# Patient Record
Sex: MALE | Race: BLACK OR AFRICAN AMERICAN | NOT HISPANIC OR LATINO | Employment: FULL TIME | URBAN - METROPOLITAN AREA
[De-identification: names, ages, dates, MRNs, and addresses within clinical notes are randomized per-mention and may not be internally consistent; named-entity substitution may affect disease eponyms.]

---

## 2017-12-31 ENCOUNTER — HISTORICAL (OUTPATIENT)
Dept: ADMINISTRATIVE | Facility: HOSPITAL | Age: 57
End: 2017-12-31

## 2018-01-03 LAB — FINAL CULTURE: NORMAL

## 2021-04-14 ENCOUNTER — HISTORICAL (OUTPATIENT)
Dept: ADMINISTRATIVE | Facility: HOSPITAL | Age: 61
End: 2021-04-14

## 2021-04-14 LAB
ABS NEUT (OLG): 4.1 X10(3)/MCL (ref 2.1–9.2)
ALBUMIN SERPL-MCNC: 3.4 GM/DL (ref 3.5–5)
ALBUMIN/GLOB SERPL: 1 RATIO (ref 1.1–2)
ALP SERPL-CCNC: 121 UNIT/L (ref 40–150)
ALT SERPL-CCNC: 13 UNIT/L (ref 0–55)
APPEARANCE, UA: CLEAR
AST SERPL-CCNC: 16 UNIT/L (ref 5–34)
BACTERIA SPEC CULT: ABNORMAL /HPF
BASOPHILS # BLD AUTO: 0.1 X10(3)/MCL (ref 0–0.2)
BASOPHILS NFR BLD AUTO: 1 %
BILIRUB SERPL-MCNC: 1.1 MG/DL
BILIRUB UR QL STRIP: ABNORMAL
BILIRUBIN DIRECT+TOT PNL SERPL-MCNC: 0.4 MG/DL (ref 0–0.5)
BILIRUBIN DIRECT+TOT PNL SERPL-MCNC: 0.7 MG/DL (ref 0–0.8)
BUN SERPL-MCNC: 18.2 MG/DL (ref 8.4–25.7)
CALCIUM SERPL-MCNC: 9.2 MG/DL (ref 8.4–10.2)
CHLORIDE SERPL-SCNC: 107 MMOL/L (ref 98–107)
CHOLEST SERPL-MCNC: 171 MG/DL
CHOLEST/HDLC SERPL: 4 {RATIO} (ref 0–5)
CK SERPL-CCNC: 130 U/L (ref 30–200)
CO2 SERPL-SCNC: 27 MMOL/L (ref 22–29)
COLOR UR: ABNORMAL
CREAT SERPL-MCNC: 1.59 MG/DL (ref 0.73–1.18)
CREAT UR-MCNC: 309.7 MG/DL (ref 58–161)
EOSINOPHIL # BLD AUTO: 0.2 X10(3)/MCL (ref 0–0.9)
EOSINOPHIL NFR BLD AUTO: 3 %
ERYTHROCYTE [DISTWIDTH] IN BLOOD BY AUTOMATED COUNT: 12.2 % (ref 11.5–17)
EST. AVERAGE GLUCOSE BLD GHB EST-MCNC: 182.9 MG/DL
GLOBULIN SER-MCNC: 3.3 GM/DL (ref 2.4–3.5)
GLUCOSE (UA): NEGATIVE
GLUCOSE SERPL-MCNC: 68 MG/DL (ref 74–100)
HBA1C MFR BLD: 8 %
HCT VFR BLD AUTO: 46.7 % (ref 42–52)
HDLC SERPL-MCNC: 38 MG/DL (ref 35–60)
HGB BLD-MCNC: 14.6 GM/DL (ref 14–18)
HGB UR QL STRIP: NEGATIVE
KETONES UR QL STRIP: ABNORMAL
LDLC SERPL CALC-MCNC: 107 MG/DL (ref 50–140)
LEUKOCYTE ESTERASE UR QL STRIP: NEGATIVE
LYMPHOCYTES # BLD AUTO: 1.3 X10(3)/MCL (ref 0.6–4.6)
LYMPHOCYTES NFR BLD AUTO: 21 %
MCH RBC QN AUTO: 29.8 PG (ref 27–31)
MCHC RBC AUTO-ENTMCNC: 31.3 GM/DL (ref 33–36)
MCV RBC AUTO: 95.3 FL (ref 80–94)
MICROALBUMIN UR-MCNC: 7643.5 UG/ML
MICROALBUMIN/CREAT RATIO PNL UR: 2468 MG/GM CR (ref 0–30)
MONOCYTES # BLD AUTO: 0.5 X10(3)/MCL (ref 0.1–1.3)
MONOCYTES NFR BLD AUTO: 8 %
NEUTROPHILS # BLD AUTO: 4.1 X10(3)/MCL (ref 2.1–9.2)
NEUTROPHILS NFR BLD AUTO: 67 %
NITRITE UR QL STRIP: NEGATIVE
PH UR STRIP: 5 [PH] (ref 5–9)
PLATELET # BLD AUTO: 414 X10(3)/MCL (ref 130–400)
PMV BLD AUTO: 11.5 FL (ref 9.4–12.4)
POTASSIUM SERPL-SCNC: 4.6 MMOL/L (ref 3.5–5.1)
PROT SERPL-MCNC: 6.7 GM/DL (ref 6.4–8.3)
PROT UR QL STRIP: ABNORMAL
PSA SERPL-MCNC: 1.74 NG/ML
RBC # BLD AUTO: 4.9 X10(6)/MCL (ref 4.7–6.1)
RBC #/AREA URNS HPF: ABNORMAL /[HPF]
SODIUM SERPL-SCNC: 143 MMOL/L (ref 136–145)
SP GR UR STRIP: 1.03 (ref 1–1.03)
SQUAMOUS EPITHELIAL, UA: ABNORMAL /HPF (ref 0–4)
TRIGL SERPL-MCNC: 130 MG/DL (ref 34–140)
TSH SERPL-ACNC: 1.33 UIU/ML (ref 0.35–4.94)
UROBILINOGEN UR STRIP-ACNC: 1
VLDLC SERPL CALC-MCNC: 26 MG/DL
WBC # SPEC AUTO: 6.1 X10(3)/MCL (ref 4.5–11.5)
WBC #/AREA URNS HPF: ABNORMAL /HPF

## 2021-05-07 ENCOUNTER — HISTORICAL (OUTPATIENT)
Dept: RADIOLOGY | Facility: HOSPITAL | Age: 61
End: 2021-05-07

## 2022-03-10 LAB
LEFT EYE DM RETINOPATHY: POSITIVE
LEFT EYE DM RETINOPATHY: POSITIVE
RIGHT EYE DM RETINOPATHY: POSITIVE
RIGHT EYE DM RETINOPATHY: POSITIVE

## 2022-04-11 ENCOUNTER — HISTORICAL (OUTPATIENT)
Dept: ADMINISTRATIVE | Facility: HOSPITAL | Age: 62
End: 2022-04-11

## 2022-04-27 VITALS
DIASTOLIC BLOOD PRESSURE: 85 MMHG | SYSTOLIC BLOOD PRESSURE: 132 MMHG | HEIGHT: 74 IN | WEIGHT: 215.19 LBS | BODY MASS INDEX: 27.62 KG/M2 | OXYGEN SATURATION: 98 %

## 2022-05-13 ENCOUNTER — TELEPHONE (OUTPATIENT)
Dept: FAMILY MEDICINE | Facility: CLINIC | Age: 62
End: 2022-05-13

## 2022-05-13 NOTE — TELEPHONE ENCOUNTER
----- Message from Jodie Hassan sent at 5/13/2022  9:49 AM CDT -----  Regarding: Return call  Type:  Patient Returning Call    Who Called:patient  Who Left Message for Patient:  Does the patient know what this is regarding?:  Would the patient rather a call back or a response via [facility]?   Best Call Back Number: [phone]  Additional Information:

## 2022-05-16 ENCOUNTER — OFFICE VISIT (OUTPATIENT)
Dept: FAMILY MEDICINE | Facility: CLINIC | Age: 62
End: 2022-05-16
Payer: COMMERCIAL

## 2022-05-16 VITALS
OXYGEN SATURATION: 97 % | DIASTOLIC BLOOD PRESSURE: 88 MMHG | WEIGHT: 224.38 LBS | BODY MASS INDEX: 28.8 KG/M2 | HEART RATE: 86 BPM | TEMPERATURE: 98 F | RESPIRATION RATE: 16 BRPM | SYSTOLIC BLOOD PRESSURE: 154 MMHG | HEIGHT: 74 IN

## 2022-05-16 DIAGNOSIS — R06.02 SOB (SHORTNESS OF BREATH): ICD-10-CM

## 2022-05-16 DIAGNOSIS — R60.0 EDEMA, LOWER EXTREMITY: ICD-10-CM

## 2022-05-16 DIAGNOSIS — I10 PRIMARY HYPERTENSION: Primary | ICD-10-CM

## 2022-05-16 DIAGNOSIS — R05.9 COUGH: ICD-10-CM

## 2022-05-16 PROBLEM — Z79.4 TYPE 2 DIABETES MELLITUS TREATED WITH INSULIN: Status: ACTIVE | Noted: 2018-07-20

## 2022-05-16 PROBLEM — I50.9 CONGESTIVE HEART FAILURE: Status: ACTIVE | Noted: 2022-05-16

## 2022-05-16 PROBLEM — N18.9 CHRONIC KIDNEY DISEASE: Status: ACTIVE | Noted: 2022-05-16

## 2022-05-16 PROBLEM — Z87.01 HISTORY OF PNEUMONIA: Status: ACTIVE | Noted: 2022-05-16

## 2022-05-16 PROBLEM — R80.9 PROTEINURIA: Chronic | Status: ACTIVE | Noted: 2021-05-31

## 2022-05-16 PROBLEM — R80.9 PROTEINURIA: Status: ACTIVE | Noted: 2021-05-31

## 2022-05-16 PROBLEM — E11.9 TYPE 2 DIABETES MELLITUS TREATED WITH INSULIN: Status: ACTIVE | Noted: 2018-07-20

## 2022-05-16 PROBLEM — Z79.4 TYPE 2 DIABETES MELLITUS TREATED WITH INSULIN: Chronic | Status: ACTIVE | Noted: 2018-07-20

## 2022-05-16 PROBLEM — N18.9 CHRONIC KIDNEY DISEASE: Chronic | Status: ACTIVE | Noted: 2022-05-16

## 2022-05-16 PROBLEM — I50.9 CONGESTIVE HEART FAILURE: Chronic | Status: ACTIVE | Noted: 2022-05-16

## 2022-05-16 PROBLEM — E11.9 TYPE 2 DIABETES MELLITUS TREATED WITH INSULIN: Chronic | Status: ACTIVE | Noted: 2018-07-20

## 2022-05-16 PROCEDURE — 3079F DIAST BP 80-89 MM HG: CPT | Mod: CPTII,,, | Performed by: FAMILY MEDICINE

## 2022-05-16 PROCEDURE — 1159F MED LIST DOCD IN RCRD: CPT | Mod: CPTII,,, | Performed by: FAMILY MEDICINE

## 2022-05-16 PROCEDURE — 3077F SYST BP >= 140 MM HG: CPT | Mod: CPTII,,, | Performed by: FAMILY MEDICINE

## 2022-05-16 PROCEDURE — 1159F PR MEDICATION LIST DOCUMENTED IN MEDICAL RECORD: ICD-10-PCS | Mod: CPTII,,, | Performed by: FAMILY MEDICINE

## 2022-05-16 PROCEDURE — 1160F RVW MEDS BY RX/DR IN RCRD: CPT | Mod: CPTII,,, | Performed by: FAMILY MEDICINE

## 2022-05-16 PROCEDURE — 3079F PR MOST RECENT DIASTOLIC BLOOD PRESSURE 80-89 MM HG: ICD-10-PCS | Mod: CPTII,,, | Performed by: FAMILY MEDICINE

## 2022-05-16 PROCEDURE — 3077F PR MOST RECENT SYSTOLIC BLOOD PRESSURE >= 140 MM HG: ICD-10-PCS | Mod: CPTII,,, | Performed by: FAMILY MEDICINE

## 2022-05-16 PROCEDURE — 4010F PR ACE/ARB THEARPY RXD/TAKEN: ICD-10-PCS | Mod: CPTII,,, | Performed by: FAMILY MEDICINE

## 2022-05-16 PROCEDURE — 3008F BODY MASS INDEX DOCD: CPT | Mod: CPTII,,, | Performed by: FAMILY MEDICINE

## 2022-05-16 PROCEDURE — 99212 PR OFFICE/OUTPT VISIT, EST, LEVL II, 10-19 MIN: ICD-10-PCS | Mod: ,,, | Performed by: FAMILY MEDICINE

## 2022-05-16 PROCEDURE — 99212 OFFICE O/P EST SF 10 MIN: CPT | Mod: ,,, | Performed by: FAMILY MEDICINE

## 2022-05-16 PROCEDURE — 3008F PR BODY MASS INDEX (BMI) DOCUMENTED: ICD-10-PCS | Mod: CPTII,,, | Performed by: FAMILY MEDICINE

## 2022-05-16 PROCEDURE — 1160F PR REVIEW ALL MEDS BY PRESCRIBER/CLIN PHARMACIST DOCUMENTED: ICD-10-PCS | Mod: CPTII,,, | Performed by: FAMILY MEDICINE

## 2022-05-16 PROCEDURE — 4010F ACE/ARB THERAPY RXD/TAKEN: CPT | Mod: CPTII,,, | Performed by: FAMILY MEDICINE

## 2022-05-16 RX ORDER — GLYBURIDE-METFORMIN HYDROCHLORIDE 5; 500 MG/1; MG/1
2 TABLET ORAL 2 TIMES DAILY WITH MEALS
COMMUNITY
Start: 2022-05-06

## 2022-05-16 RX ORDER — SYRING-NEEDL,DISP,INSUL,0.3 ML 29 G X1/2"
1 SYRINGE, EMPTY DISPOSABLE MISCELLANEOUS NIGHTLY
COMMUNITY
Start: 2022-02-21

## 2022-05-16 RX ORDER — DAPAGLIFLOZIN 10 MG/1
10 TABLET, FILM COATED ORAL DAILY
COMMUNITY
Start: 2021-11-29

## 2022-05-16 RX ORDER — INSULIN GLARGINE 100 [IU]/ML
16 INJECTION, SOLUTION SUBCUTANEOUS NIGHTLY
COMMUNITY
Start: 2022-02-22

## 2022-05-16 RX ORDER — ATORVASTATIN CALCIUM 40 MG/1
40 TABLET, FILM COATED ORAL NIGHTLY
COMMUNITY
Start: 2022-05-06

## 2022-05-16 RX ORDER — POTASSIUM CHLORIDE 20 MEQ/1
20 TABLET, EXTENDED RELEASE ORAL DAILY PRN
COMMUNITY
Start: 2022-05-06

## 2022-05-16 RX ORDER — PEN NEEDLE, DIABETIC 32 GX 1/6"
1 NEEDLE, DISPOSABLE MISCELLANEOUS DAILY
COMMUNITY
Start: 2022-04-19

## 2022-05-16 RX ORDER — SACUBITRIL AND VALSARTAN 97; 103 MG/1; MG/1
1 TABLET, FILM COATED ORAL 2 TIMES DAILY
COMMUNITY
Start: 2022-04-24

## 2022-05-16 RX ORDER — ALBUTEROL SULFATE 90 UG/1
1 AEROSOL, METERED RESPIRATORY (INHALATION) EVERY 4 HOURS PRN
COMMUNITY
Start: 2022-04-10

## 2022-05-16 RX ORDER — FUROSEMIDE 40 MG/1
1 TABLET ORAL DAILY PRN
COMMUNITY
Start: 2022-05-09

## 2022-05-16 RX ORDER — CARVEDILOL 12.5 MG/1
12.5 TABLET ORAL 2 TIMES DAILY
COMMUNITY
Start: 2021-11-29 | End: 2022-05-16

## 2022-05-16 RX ORDER — UMECLIDINIUM BROMIDE AND VILANTEROL TRIFENATATE 62.5; 25 UG/1; UG/1
1 POWDER RESPIRATORY (INHALATION) DAILY PRN
COMMUNITY
Start: 2022-04-12

## 2022-05-16 RX ORDER — CLONIDINE HYDROCHLORIDE 0.1 MG/1
0.1 TABLET ORAL 3 TIMES DAILY
COMMUNITY
Start: 2021-12-15 | End: 2022-05-16

## 2022-05-16 RX ORDER — CARVEDILOL 6.25 MG/1
6.25 TABLET ORAL 2 TIMES DAILY WITH MEALS
COMMUNITY

## 2022-05-16 RX ORDER — ASPIRIN 81 MG/1
81 TABLET ORAL DAILY
COMMUNITY
Start: 2021-07-21

## 2022-05-16 RX ORDER — FLASH GLUCOSE SENSOR
1 KIT MISCELLANEOUS DAILY
COMMUNITY
Start: 2022-05-06

## 2022-05-16 NOTE — PROGRESS NOTES
Subjective:      Patient ID: Dominick Woody is a 61 y.o. male.    Chief Complaint: discuss medications    Disclaimer:  This note is prepared using voice recognition software and as such is likely to have errors and has not been proof read. Please contact me for questions.     61-year-old male with history of hypertension, diabetes mellitus type 2, hyperlipidemia and CHF who presents for discussion of medication management and shortness of breath.  The patient states that he was initially taking Coreg at 12.5 mg b.i.d. however his medication was decreased to 6.25 mg b.i.d. by his new cardiologist.  The patient states that he has noticed an increase in his blood pressure as well as increased lower extremity swelling since that time.  The patient would like to increase Coreg back to 12.5 mg b.i.d..  The patient states he has also had increased shortness of breath over the last few weeks in addition to the worsening lower extremity edema.  He states that he took a 40 mg tablet of Lasix this morning.  He denies any chest pain, dizziness, blurred vision, palpitations, or headaches.  The patient admits to mild intermittent cough but denies any sinus congestion, ear pain, sore throat, fever, or exposure to sick contacts.        Review of Systems   Constitutional: Negative for chills, diaphoresis, fever and malaise/fatigue.   Eyes: Negative for blurred vision and double vision.   Respiratory: Positive for cough and shortness of breath. Negative for wheezing.    Cardiovascular: Positive for leg swelling. Negative for chest pain and palpitations.   Gastrointestinal: Negative for abdominal pain, diarrhea, nausea and vomiting.   Neurological: Negative for dizziness and headaches.     Objective:     Vitals:    05/16/22 1314   BP: (!) 160/84   BP Location: Left arm   Patient Position: Sitting   BP Method: Large (Manual)   Pulse: 86   Resp: 16   Temp: 98 °F (36.7 °C)   TempSrc: Oral   SpO2: 97%   Weight: 101.8 kg (224 lb 6.4 oz)  "  Height: 6' 2" (1.88 m)     Physical Exam  Vitals and nursing note reviewed.   Constitutional:       General: He is not in acute distress.     Appearance: Normal appearance. He is well-developed and well-groomed.   HENT:      Head: Normocephalic and atraumatic.      Right Ear: External ear normal.      Left Ear: External ear normal.      Nose: Nose normal.      Mouth/Throat:      Mouth: Mucous membranes are moist.      Pharynx: Oropharynx is clear.   Eyes:      General: No scleral icterus.     Extraocular Movements: Extraocular movements intact.      Conjunctiva/sclera: Conjunctivae normal.   Cardiovascular:      Rate and Rhythm: Normal rate and regular rhythm.      Pulses: Normal pulses.      Heart sounds: Normal heart sounds, S1 normal and S2 normal. No murmur heard.    No friction rub. No gallop.   Pulmonary:      Effort: Pulmonary effort is normal. No tachypnea, bradypnea, prolonged expiration or respiratory distress.      Breath sounds: Normal breath sounds and air entry. No stridor or decreased air movement. No wheezing, rhonchi or rales.   Abdominal:      Palpations: There is no hepatomegaly or splenomegaly.   Musculoskeletal:         General: Normal range of motion.      Cervical back: Normal range of motion and neck supple.      Right lower leg: No tenderness. 2+ Pitting Edema present.      Left lower leg: No tenderness. 2+ Pitting Edema present.      Comments: Edema confined to bilateral ankles   Skin:     General: Skin is warm and dry.      Capillary Refill: Capillary refill takes less than 2 seconds.      Coloration: Skin is not jaundiced.      Findings: No rash.   Neurological:      General: No focal deficit present.      Mental Status: He is alert and oriented to person, place, and time. Mental status is at baseline.      Cranial Nerves: No cranial nerve deficit.      Sensory: No sensory deficit.      Motor: No weakness.   Psychiatric:         Attention and Perception: Attention normal.         Mood " and Affect: Mood is anxious.         Speech: Speech normal.         Behavior: Behavior normal. Behavior is cooperative.       Assessment:     1. Primary hypertension    2. SOB (shortness of breath)    3. Cough    4. Edema, lower extremity      Plan:   Dominick was seen today for discuss medications.    Diagnoses and all orders for this visit:    Primary hypertension  - Patient's BP elevated in clinic and he appears mildly anxious about his medication changes.  - Repeat BP mildly improved  - Continue Coreg 6.25mg BID at this time and update medication list will be requested from cardiologist (Dr. Chaudhary) as patient has previously stated that his doctor increased his dosage.  - Monitor BP at home and keep BP log.  - Low sodium diet recommended.  - Repeat BP check in 2 weeks and will consider increased dosage of medication if BP remains elevated.    SOB (shortness of breath)  -     BNP; Future  -     Comprehensive Metabolic Panel; Future  -     CBC Auto Differential; Future  -     X-Ray Chest PA And Lateral; Future  - Patient will proceed to the urgent care to have above labs and XR done immediately.  - Strict ED precautions discussed with patient who verbalized understanding and agreement.    Cough  -     BNP; Future  -     Comprehensive Metabolic Panel; Future  -     CBC Auto Differential; Future  -     X-Ray Chest PA And Lateral; Future  - Counseled patient that COVID-19 testing should be considered.  - Tylenol as needed for pain and may consider OTC throat lozenges.      Edema, lower extremity  -     BNP; Future  -     Comprehensive Metabolic Panel; Future  -     CBC Auto Differential; Future  -     X-Ray Chest PA And Lateral; Future  - Recommend elevating feet and continue daily Lasix PRN.  - Monitor for worsening symptoms and contact clinic if any concerns arise.        Follow up in about 1 month (around 6/16/2022) for NURSE VISIT BP CHECK in 2 weeks, HTN Follow Up.

## 2022-05-17 ENCOUNTER — TELEPHONE (OUTPATIENT)
Dept: ADMINISTRATIVE | Facility: HOSPITAL | Age: 62
End: 2022-05-17
Payer: COMMERCIAL

## 2022-05-17 ENCOUNTER — LAB VISIT (OUTPATIENT)
Dept: LAB | Facility: HOSPITAL | Age: 62
End: 2022-05-17
Attending: FAMILY MEDICINE
Payer: COMMERCIAL

## 2022-05-17 DIAGNOSIS — R06.02 SOB (SHORTNESS OF BREATH): ICD-10-CM

## 2022-05-17 DIAGNOSIS — R60.0 EDEMA, LOWER EXTREMITY: ICD-10-CM

## 2022-05-17 DIAGNOSIS — R05.9 COUGH: ICD-10-CM

## 2022-05-17 LAB
ALBUMIN SERPL-MCNC: 3.1 GM/DL (ref 3.5–5)
ALBUMIN/GLOB SERPL: 1.2 RATIO (ref 1.1–2)
ALP SERPL-CCNC: 82 UNIT/L (ref 40–150)
ALT SERPL-CCNC: 28 UNIT/L (ref 0–55)
AST SERPL-CCNC: 17 UNIT/L (ref 5–34)
BASOPHILS # BLD AUTO: 0.05 X10(3)/MCL (ref 0–0.2)
BASOPHILS NFR BLD AUTO: 0.9 %
BILIRUBIN DIRECT+TOT PNL SERPL-MCNC: 1 MG/DL
BNP BLD-MCNC: 2536.5 PG/ML
BUN SERPL-MCNC: 17.1 MG/DL (ref 8.4–25.7)
CALCIUM SERPL-MCNC: 8.8 MG/DL (ref 8.4–10.2)
CHLORIDE SERPL-SCNC: 108 MMOL/L (ref 98–107)
CO2 SERPL-SCNC: 29 MMOL/L (ref 22–29)
CREAT SERPL-MCNC: 1.59 MG/DL (ref 0.73–1.18)
EOSINOPHIL # BLD AUTO: 0.14 X10(3)/MCL (ref 0–0.9)
EOSINOPHIL NFR BLD AUTO: 2.6 %
ERYTHROCYTE [DISTWIDTH] IN BLOOD BY AUTOMATED COUNT: 14.2 % (ref 11.5–17)
GLOBULIN SER-MCNC: 2.5 GM/DL (ref 2.4–3.5)
GLUCOSE SERPL-MCNC: 84 MG/DL (ref 74–100)
HCT VFR BLD AUTO: 41.1 % (ref 42–52)
HGB BLD-MCNC: 13.1 GM/DL (ref 14–18)
IMM GRANULOCYTES # BLD AUTO: 0.03 X10(3)/MCL (ref 0–0.02)
IMM GRANULOCYTES NFR BLD AUTO: 0.6 % (ref 0–0.43)
LYMPHOCYTES # BLD AUTO: 1.13 X10(3)/MCL (ref 0.6–4.6)
LYMPHOCYTES NFR BLD AUTO: 20.8 %
MCH RBC QN AUTO: 29.9 PG (ref 27–31)
MCHC RBC AUTO-ENTMCNC: 31.9 MG/DL (ref 33–36)
MCV RBC AUTO: 93.8 FL (ref 80–94)
MONOCYTES # BLD AUTO: 0.53 X10(3)/MCL (ref 0.1–1.3)
MONOCYTES NFR BLD AUTO: 9.8 %
NEUTROPHILS # BLD AUTO: 3.6 X10(3)/MCL (ref 2.1–9.2)
NEUTROPHILS NFR BLD AUTO: 65.3 %
NRBC BLD AUTO-RTO: 0 %
PLATELET # BLD AUTO: 245 X10(3)/MCL (ref 130–400)
PMV BLD AUTO: 11.3 FL (ref 9.4–12.4)
POTASSIUM SERPL-SCNC: 3.7 MMOL/L (ref 3.5–5.1)
PROT SERPL-MCNC: 5.6 GM/DL (ref 6.4–8.3)
RBC # BLD AUTO: 4.38 X10(6)/MCL (ref 4.7–6.1)
SODIUM SERPL-SCNC: 145 MMOL/L (ref 136–145)
WBC # SPEC AUTO: 5.4 X10(3)/MCL (ref 4.5–11.5)

## 2022-05-17 PROCEDURE — 85025 COMPLETE CBC W/AUTO DIFF WBC: CPT

## 2022-05-17 PROCEDURE — 36415 COLL VENOUS BLD VENIPUNCTURE: CPT

## 2022-05-17 PROCEDURE — 80053 COMPREHEN METABOLIC PANEL: CPT

## 2022-05-17 PROCEDURE — 83880 ASSAY OF NATRIURETIC PEPTIDE: CPT

## 2022-05-17 NOTE — TELEPHONE ENCOUNTER
Type:  Needs Medical Advice    Who Called:self  Symptoms (please be specific): na   How long has patient had these symptoms:  na  Pharmacy name and phone #:  na  Would the patient rather a call back or a response via [facility]? Call back  Best Call Back Number: [phone]  Additional Information: pt stated that he is not able to get intouch with Dr. Musa to get medication for colonoscopy

## 2022-05-19 ENCOUNTER — TELEPHONE (OUTPATIENT)
Dept: FAMILY MEDICINE | Facility: CLINIC | Age: 62
End: 2022-05-19
Payer: COMMERCIAL

## 2022-05-19 ENCOUNTER — HOSPITAL ENCOUNTER (EMERGENCY)
Facility: HOSPITAL | Age: 62
Discharge: HOME | End: 2022-05-19
Attending: EMERGENCY MEDICINE
Payer: COMMERCIAL

## 2022-05-19 VITALS
HEART RATE: 75 BPM | RESPIRATION RATE: 19 BRPM | DIASTOLIC BLOOD PRESSURE: 100 MMHG | OXYGEN SATURATION: 98 % | SYSTOLIC BLOOD PRESSURE: 154 MMHG | TEMPERATURE: 98 F

## 2022-05-19 DIAGNOSIS — R89.9 ABNORMAL LABORATORY TEST: Primary | ICD-10-CM

## 2022-05-19 LAB
ALBUMIN SERPL-MCNC: 3.2 GM/DL (ref 3.5–5)
ALBUMIN/GLOB SERPL: 1 RATIO (ref 1.1–2)
ALP SERPL-CCNC: 87 UNIT/L (ref 40–150)
ALT SERPL-CCNC: 26 UNIT/L (ref 0–55)
AST SERPL-CCNC: 19 UNIT/L (ref 5–34)
BASOPHILS # BLD AUTO: 0.06 X10(3)/MCL (ref 0–0.2)
BASOPHILS NFR BLD AUTO: 1.3 %
BILIRUBIN DIRECT+TOT PNL SERPL-MCNC: 0.9 MG/DL
BNP BLD-MCNC: 2208.1 PG/ML
BUN SERPL-MCNC: 16.8 MG/DL (ref 8.4–25.7)
CALCIUM SERPL-MCNC: 9 MG/DL (ref 8.4–10.2)
CHLORIDE SERPL-SCNC: 108 MMOL/L (ref 98–107)
CO2 SERPL-SCNC: 25 MMOL/L (ref 22–29)
CREAT SERPL-MCNC: 1.62 MG/DL (ref 0.73–1.18)
EOSINOPHIL # BLD AUTO: 0.19 X10(3)/MCL (ref 0–0.9)
EOSINOPHIL NFR BLD AUTO: 4 %
ERYTHROCYTE [DISTWIDTH] IN BLOOD BY AUTOMATED COUNT: 14.4 % (ref 11.5–17)
GLOBULIN SER-MCNC: 3.1 GM/DL (ref 2.4–3.5)
GLUCOSE SERPL-MCNC: 76 MG/DL (ref 74–100)
HCT VFR BLD AUTO: 42.1 % (ref 42–52)
HGB BLD-MCNC: 13.3 GM/DL (ref 14–18)
IMM GRANULOCYTES # BLD AUTO: 0.01 X10(3)/MCL (ref 0–0.02)
IMM GRANULOCYTES NFR BLD AUTO: 0.2 % (ref 0–0.43)
LYMPHOCYTES # BLD AUTO: 1.03 X10(3)/MCL (ref 0.6–4.6)
LYMPHOCYTES NFR BLD AUTO: 21.5 %
MCH RBC QN AUTO: 30.5 PG (ref 27–31)
MCHC RBC AUTO-ENTMCNC: 31.6 MG/DL (ref 33–36)
MCV RBC AUTO: 96.6 FL (ref 80–94)
MONOCYTES # BLD AUTO: 0.52 X10(3)/MCL (ref 0.1–1.3)
MONOCYTES NFR BLD AUTO: 10.8 %
NEUTROPHILS # BLD AUTO: 3 X10(3)/MCL (ref 2.1–9.2)
NEUTROPHILS NFR BLD AUTO: 62.2 %
NRBC BLD AUTO-RTO: 0 %
PLATELET # BLD AUTO: 261 X10(3)/MCL (ref 130–400)
PMV BLD AUTO: 11.3 FL (ref 9.4–12.4)
POCT GLUCOSE: 166 MG/DL (ref 70–110)
POTASSIUM SERPL-SCNC: 3.3 MMOL/L (ref 3.5–5.1)
PROT SERPL-MCNC: 6.3 GM/DL (ref 6.4–8.3)
RBC # BLD AUTO: 4.36 X10(6)/MCL (ref 4.7–6.1)
SODIUM SERPL-SCNC: 144 MMOL/L (ref 136–145)
TROPONIN I SERPL-MCNC: 0.06 NG/ML (ref 0–0.04)
WBC # SPEC AUTO: 4.8 X10(3)/MCL (ref 4.5–11.5)

## 2022-05-19 PROCEDURE — 93010 ELECTROCARDIOGRAM REPORT: CPT | Mod: ,,, | Performed by: INTERNAL MEDICINE

## 2022-05-19 PROCEDURE — 85025 COMPLETE CBC W/AUTO DIFF WBC: CPT | Performed by: PHYSICIAN ASSISTANT

## 2022-05-19 PROCEDURE — 82962 GLUCOSE BLOOD TEST: CPT

## 2022-05-19 PROCEDURE — 93010 EKG 12-LEAD: ICD-10-PCS | Mod: ,,, | Performed by: INTERNAL MEDICINE

## 2022-05-19 PROCEDURE — 99285 EMERGENCY DEPT VISIT HI MDM: CPT | Mod: 25

## 2022-05-19 PROCEDURE — 93005 ELECTROCARDIOGRAM TRACING: CPT

## 2022-05-19 PROCEDURE — 83880 ASSAY OF NATRIURETIC PEPTIDE: CPT | Performed by: PHYSICIAN ASSISTANT

## 2022-05-19 RX ORDER — CLONIDINE HYDROCHLORIDE 0.2 MG/1
0.2 TABLET ORAL
Status: DISCONTINUED | OUTPATIENT
Start: 2022-05-19 | End: 2022-05-19

## 2022-05-19 NOTE — FIRST PROVIDER EVALUATION
Medical screening exam completed.  I have conducted a focused provider triage encounter, findings are as follows:    Brief history of present illness:  62 yo male presents to ED for evaluation of abnormal labs. Patient reports elevated BNP of 252 and Cr of 1.5 on Tuesday. Sent by PCP for further evaluation. Reports intermittent leg swelling.     Vitals:    05/19/22 1017   Pulse: 74   Resp: 18   Temp: 98.2 °F (36.8 °C)   TempSrc: Oral   SpO2: 95%       Pertinent physical exam:  Awake, alert, and oriented. Ambulatory with steady gait.      Brief workup plan:  Will get basic labs including repeat BNP, EKG, and chest XR.     Preliminary workup initiated; this workup will be continued and followed by the physician or advanced practice provider that is assigned to the patient when roomed.

## 2022-05-19 NOTE — TELEPHONE ENCOUNTER
----- Message from Seferino Boykin MD sent at 5/17/2022 11:10 AM CDT -----  Patient has significantly elevated BNP with LE edema and complaints of SOB. Recommended futher evaluation in the ED immediately for likely CHF exacerbation. Creatinine elevated and low albumin as well as total protein which may be due to above.

## 2022-05-19 NOTE — ED PROVIDER NOTES
"Encounter Date: 5/19/2022       History     Chief Complaint   Patient presents with    Abnormal labs     Pt to ER via POV for abnormal labs.  States went to PCP this week for Sob and had blood work.  Pt states they called him and told hi labs were abnormal and to come to ER for eval.  Pt states is also having swelling in legs     Patient states that he was instructed to report to the ED by his PCP due to his BNP being elevated. States hx. Of CHF. Denies any SOB, chest pain, nausea, coughing, palpitations, or weight gain. States mild swelling to bilateral ankles. States that he took one dose of his PRN Lasix this morning. States that he "feels fine" and does not have any complaints other than abnormal outpatient labs.         Review of patient's allergies indicates:   Allergen Reactions    Gabapentin Other (See Comments)     Mood swings      Amlodipine Other (See Comments)     Other reaction(s): Swelling     Past Medical History:   Diagnosis Date    Benign essential hypertension 10/9/2015    Chronic kidney disease 5/16/2022    Congestive heart failure 5/16/2022    Dyslipidemia 10/9/2015    Erectile dysfunction 9/9/2015    History of pneumonia 5/16/2022    HLD (hyperlipidemia) 6/13/2014    HTN (hypertension) 6/13/2014    Low testosterone 6/13/2014    Proteinuria 5/31/2021    Reduced libido 6/13/2014    Type 2 diabetes mellitus treated with insulin 7/20/2018     History reviewed. No pertinent surgical history.  Family History   Problem Relation Age of Onset    Heart failure Mother     Hypertension Mother     Hyperlipidemia Mother     Diabetes Mother      Social History     Tobacco Use    Smoking status: Never Smoker    Smokeless tobacco: Never Used   Substance Use Topics    Alcohol use: Not Currently    Drug use: Never     Review of Systems   Constitutional: Negative.  Negative for chills.   HENT: Negative.  Negative for congestion.    Eyes: Negative.    Respiratory: Negative.  Negative for " cough, chest tightness and shortness of breath.    Cardiovascular: Negative.  Negative for chest pain and palpitations.   Gastrointestinal: Negative.  Negative for abdominal pain.   Endocrine: Negative.    Genitourinary: Negative.    Musculoskeletal: Negative.    Skin: Negative.    Allergic/Immunologic: Negative.    Neurological: Positive for dizziness. Negative for weakness.   Hematological: Negative.    Psychiatric/Behavioral: Negative.    All other systems reviewed and are negative.      Physical Exam     Initial Vitals [05/19/22 1017]   BP Pulse Resp Temp SpO2   (!) 146/97 74 18 98.2 °F (36.8 °C) 99 %      MAP       --         Physical Exam    Constitutional: Vital signs are normal. He appears well-developed and well-nourished. No distress.   HENT:   Head: Normocephalic and atraumatic.   Eyes: Conjunctivae are normal. Pupils are equal, round, and reactive to light.   Neck: Neck supple. No JVD present.   Normal range of motion.  Cardiovascular: Normal rate, regular rhythm, normal heart sounds, intact distal pulses and normal pulses.   Pulses:       Dorsalis pedis pulses are 2+ on the right side and 2+ on the left side.   Pulmonary/Chest: Effort normal and breath sounds normal. No respiratory distress. He exhibits no tenderness.   Abdominal: Abdomen is soft. Bowel sounds are normal.   Musculoskeletal:         General: No tenderness. Normal range of motion.      Cervical back: Normal range of motion and neck supple.      Right ankle: Swelling (mild +1 edema) present. Normal pulse.      Left ankle: Swelling (mild +1 edema) present. Normal pulse.     Neurological: He is alert and oriented to person, place, and time. No cranial nerve deficit. GCS score is 15. GCS eye subscore is 4. GCS verbal subscore is 5. GCS motor subscore is 6.   Skin: Skin is warm and dry. Capillary refill takes less than 2 seconds.   Psychiatric: He has a normal mood and affect.         ED Course   Procedures  Labs Reviewed   B-TYPE NATRIURETIC  PEPTIDE - Abnormal; Notable for the following components:       Result Value    Natriuretic Peptide 2,208.1 (*)     All other components within normal limits   COMPREHENSIVE METABOLIC PANEL - Abnormal; Notable for the following components:    Potassium Level 3.3 (*)     Chloride 108 (*)     Creatinine 1.62 (*)     Protein Total 6.3 (*)     Albumin Level 3.2 (*)     Albumin/Globulin Ratio 1.0 (*)     All other components within normal limits   TROPONIN I - Abnormal; Notable for the following components:    Troponin-I 0.058 (*)     All other components within normal limits   CBC WITH DIFFERENTIAL - Abnormal; Notable for the following components:    RBC 4.36 (*)     Hgb 13.3 (*)     MCV 96.6 (*)     MCHC 31.6 (*)     All other components within normal limits   CBC W/ AUTO DIFFERENTIAL    Narrative:     The following orders were created for panel order CBC auto differential.  Procedure                               Abnormality         Status                     ---------                               -----------         ------                     CBC with Differential[[id number]]        Abnormal            Final result                 Please view results for these tests on the individual orders.     EKG Readings: (Independently Interpreted)   Initial Reading: No STEMI. Rhythm: Normal Sinus Rhythm. Heart Rate: 73. Ectopy: No Ectopy. Conduction: LAFB. ST Segments: Normal ST Segments. T Waves: Normal. Axis: Normal. Clinical Impression: Normal Sinus Rhythm     ECG Results          EKG 12-lead (Final result)  Result time 05/19/22 13:33:54    Final result by Interface, Lab In [facility] (05/19/22 13:33:54)                 Narrative:    Test Reason : R06.02,    Vent. Rate : 073 BPM     Atrial Rate : 073 BPM     P-R Int : 198 ms          QRS Dur : 088 ms      QT Int : 410 ms       P-R-T Axes : 051 -73 246 degrees     QTc Int : 451 ms    Normal sinus rhythm  Left anterior fascicular block  Possible Anterior infarct ,age  undetermined  T wave abnormality, consider inferolateral ischemia  Abnormal ECG  No previous ECGs available  Confirmed by Andrew Nelson MD ([id number]) on 5/19/2022 1:33:46 PM    Referred By: TIM   SELF           Confirmed By:Andrew Nelson MD                            Imaging Results          X-Ray Chest PA And Lateral (Final result)  Result time 05/19/22 11:00:59    Final result by Inessa Marroquin MD (05/19/22 11:00:59)                 Impression:      No acute thoracic abnormality.    No interval change.      Electronically signed by: Inessa Marroquin  Date:    05/19/2022  Time:    11:00             Narrative:    EXAMINATION:  XR CHEST PA AND LATERAL    CLINICAL HISTORY:  Shortness of breath    COMPARISON:  05/16/2022.    FINDINGS:  No focal consolidations, pleural effusions or pneumothoraces.    Cardiomediastinal silhouette within normal limits.    No acute bony pathology.    Soft tissues within normal limits.                                 Medications - No data to display  Medical Decision Making:   Initial Assessment:   Patient is awake, alert, and in no distress. States that he was instructed to report to the ED for evaluation due to abnormal labs. Denies any chest pain, SOB, or any other symptoms.  Differential Diagnosis:   CHF exacerbation, abnormal labs, SOB   Clinical Tests:   Lab Tests: Reviewed       <> Summary of Lab: Labs are at baseline. Patient's BNP and Trop are elevated but at his baseline.   Radiological Study: Reviewed  ED Management:  Discussed patient with Dr. Cruz and we will consult patient's cardiologist for recommendations.   Other:   I have discussed this case with another health care provider.       <> Summary of the Discussion: 1430- Spoke with Dr. Mercedes Chaudhary, patient's cardiologist, and reviewed patient's labs (elevated BNP, Trop) and patient's exam for recommendations. Since patient is asymptomatic without any chest pain, SOB, or other symptoms we will allow patient to be  discharged home and f/u with his cardiologist.                       Clinical Impression:   Final diagnoses:  [R89.9] Abnormal laboratory test (Primary)          ED Disposition Condition    Discharge Stable        ED Prescriptions     None        Follow-up Information     Follow up With Specialties Details Why Contact Info    Shyann Prasad MD Family Medicine In 2 days  [address]  [address]  [facility] [address]  [phone]      Mercedes Chaudhary  In 2 days             SCOTT Bergman  05/19/22 2129

## 2022-05-25 ENCOUNTER — TELEPHONE (OUTPATIENT)
Dept: FAMILY MEDICINE | Facility: CLINIC | Age: 62
End: 2022-05-25
Payer: COMMERCIAL

## 2022-05-25 DIAGNOSIS — Z12.11 SCREENING FOR COLON CANCER: Primary | ICD-10-CM

## 2022-05-25 NOTE — TELEPHONE ENCOUNTER
----- Message from Rito Pang MA sent at 5/24/2022 11:28 AM CDT -----  Regarding: FW: Referral  Patient wants a new gastro referral for Colonoscopy. Refuses to see Dr Musa's office as they will not call him back   ----- Message -----  From: SCOTT Alvarado  Sent: 5/20/2022  12:29 PM CDT  To: Rito Pang MA  Subject: RE: Referral                                     Can you check on status of referral and ensure it was received?     ----- Message -----  From: Rito Pang MA  Sent: 5/20/2022  10:39 AM CDT  To: Shyann Prasad MD  Subject: FW: Referral                                       ----- Message -----  From: Jodie Hassan  Sent: 5/20/2022  10:25 AM CDT  To: [facility]DR[facility] Staff  Subject: Referral                                         Type:  Patient Requesting Referral    Who Called:Patient  Does the patient already have the specialty appointment scheduled?: gastro for his colonoscopy  If yes, what is the date of that appointment?:  Referral to What Specialty:  Reason for Referral:  Does the patient want the referral with a specific physician?:  Is the specialist an [facility]?:  Patient Requesting a Response?:  Would the patient rather a call back or a response via [facility]?   Best Call Back Number:[phone]  Additional Information: Dr Immanuel Musa is not answering the phone so he would like a referral elsewhere.

## 2022-06-03 ENCOUNTER — LAB VISIT (OUTPATIENT)
Dept: LAB | Facility: HOSPITAL | Age: 62
End: 2022-06-03
Attending: FAMILY MEDICINE
Payer: COMMERCIAL

## 2022-06-03 ENCOUNTER — OFFICE VISIT (OUTPATIENT)
Dept: FAMILY MEDICINE | Facility: CLINIC | Age: 62
End: 2022-06-03
Payer: COMMERCIAL

## 2022-06-03 VITALS
HEIGHT: 74 IN | WEIGHT: 210 LBS | DIASTOLIC BLOOD PRESSURE: 80 MMHG | OXYGEN SATURATION: 98 % | BODY MASS INDEX: 26.95 KG/M2 | SYSTOLIC BLOOD PRESSURE: 136 MMHG | HEART RATE: 81 BPM | TEMPERATURE: 98 F | RESPIRATION RATE: 18 BRPM

## 2022-06-03 DIAGNOSIS — Z79.4 TYPE 2 DIABETES MELLITUS TREATED WITH INSULIN: Chronic | ICD-10-CM

## 2022-06-03 DIAGNOSIS — E11.9 TYPE 2 DIABETES MELLITUS TREATED WITH INSULIN: Chronic | ICD-10-CM

## 2022-06-03 DIAGNOSIS — Z12.5 SCREENING PSA (PROSTATE SPECIFIC ANTIGEN): ICD-10-CM

## 2022-06-03 DIAGNOSIS — I10 BENIGN ESSENTIAL HYPERTENSION: Chronic | ICD-10-CM

## 2022-06-03 DIAGNOSIS — E78.5 DYSLIPIDEMIA: Chronic | ICD-10-CM

## 2022-06-03 DIAGNOSIS — R79.89 LOW TESTOSTERONE: Chronic | ICD-10-CM

## 2022-06-03 DIAGNOSIS — I50.9 CONGESTIVE HEART FAILURE, UNSPECIFIED HF CHRONICITY, UNSPECIFIED HEART FAILURE TYPE: Chronic | ICD-10-CM

## 2022-06-03 DIAGNOSIS — Z00.00 WELLNESS EXAMINATION: ICD-10-CM

## 2022-06-03 DIAGNOSIS — Z12.11 SCREENING FOR COLON CANCER: ICD-10-CM

## 2022-06-03 DIAGNOSIS — Z00.00 WELLNESS EXAMINATION: Primary | ICD-10-CM

## 2022-06-03 LAB
ALBUMIN SERPL-MCNC: 3.2 GM/DL (ref 3.5–5)
ALBUMIN/GLOB SERPL: 1.1 RATIO (ref 1.1–2)
ALP SERPL-CCNC: 87 UNIT/L (ref 40–150)
ALT SERPL-CCNC: 12 UNIT/L (ref 0–55)
APPEARANCE UR: CLEAR
AST SERPL-CCNC: 13 UNIT/L (ref 5–34)
BACTERIA #/AREA URNS AUTO: NORMAL /HPF
BASOPHILS # BLD AUTO: 0.08 X10(3)/MCL (ref 0–0.2)
BASOPHILS NFR BLD AUTO: 1.6 %
BILIRUB UR QL STRIP.AUTO: NEGATIVE MG/DL
BILIRUBIN DIRECT+TOT PNL SERPL-MCNC: 0.7 MG/DL
BNP BLD-MCNC: 988.7 PG/ML
BUN SERPL-MCNC: 18.6 MG/DL (ref 8.4–25.7)
CALCIUM SERPL-MCNC: 9 MG/DL (ref 8.4–10.2)
CHLORIDE SERPL-SCNC: 108 MMOL/L (ref 98–107)
CHOLEST SERPL-MCNC: 166 MG/DL
CHOLEST/HDLC SERPL: 4 {RATIO} (ref 0–5)
CK SERPL-CCNC: 205 U/L (ref 30–200)
CO2 SERPL-SCNC: 26 MMOL/L (ref 22–29)
COLOR UR AUTO: YELLOW
CREAT SERPL-MCNC: 1.54 MG/DL (ref 0.73–1.18)
CREAT UR-MCNC: 122.9 MG/DL (ref 63–166)
EOSINOPHIL # BLD AUTO: 0.3 X10(3)/MCL (ref 0–0.9)
EOSINOPHIL NFR BLD AUTO: 6 %
ERYTHROCYTE [DISTWIDTH] IN BLOOD BY AUTOMATED COUNT: 13.7 % (ref 11.5–17)
EST. AVERAGE GLUCOSE BLD GHB EST-MCNC: 148.5 MG/DL
GLOBULIN SER-MCNC: 2.8 GM/DL (ref 2.4–3.5)
GLUCOSE SERPL-MCNC: 137 MG/DL (ref 74–100)
GLUCOSE UR QL STRIP.AUTO: ABNORMAL MG/DL
HBA1C MFR BLD: 6.8 %
HCT VFR BLD AUTO: 43.5 % (ref 42–52)
HDLC SERPL-MCNC: 41 MG/DL (ref 35–60)
HGB BLD-MCNC: 13.7 GM/DL (ref 14–18)
HIV 1+2 AB+HIV1 P24 AG SERPL QL IA: NONREACTIVE
IMM GRANULOCYTES # BLD AUTO: 0.01 X10(3)/MCL (ref 0–0.02)
IMM GRANULOCYTES NFR BLD AUTO: 0.2 % (ref 0–0.43)
KETONES UR QL STRIP.AUTO: NEGATIVE MG/DL
LDLC SERPL CALC-MCNC: 108 MG/DL (ref 50–140)
LEUKOCYTE ESTERASE UR QL STRIP.AUTO: NEGATIVE UNIT/L
LYMPHOCYTES # BLD AUTO: 1.21 X10(3)/MCL (ref 0.6–4.6)
LYMPHOCYTES NFR BLD AUTO: 24.2 %
MCH RBC QN AUTO: 29.6 PG (ref 27–31)
MCHC RBC AUTO-ENTMCNC: 31.5 MG/DL (ref 33–36)
MCV RBC AUTO: 94 FL (ref 80–94)
MICROALBUMIN UR-MCNC: >2000 UG/ML
MICROALBUMIN/CREAT RATIO PNL UR: >1627.3 MG/GM CR (ref 0–30)
MONOCYTES # BLD AUTO: 0.41 X10(3)/MCL (ref 0.1–1.3)
MONOCYTES NFR BLD AUTO: 8.2 %
NEUTROPHILS # BLD AUTO: 3 X10(3)/MCL (ref 2.1–9.2)
NEUTROPHILS NFR BLD AUTO: 59.8 %
NITRITE UR QL STRIP.AUTO: NEGATIVE
NRBC BLD AUTO-RTO: 0 %
PH UR STRIP.AUTO: 5.5 [PH]
PLATELET # BLD AUTO: 261 X10(3)/MCL (ref 130–400)
PMV BLD AUTO: 11.7 FL (ref 9.4–12.4)
POTASSIUM SERPL-SCNC: 3.9 MMOL/L (ref 3.5–5.1)
PROT SERPL-MCNC: 6 GM/DL (ref 6.4–8.3)
PROT UR QL STRIP.AUTO: ABNORMAL MG/DL
PSA SERPL-MCNC: 0.87 NG/ML
RBC # BLD AUTO: 4.63 X10(6)/MCL (ref 4.7–6.1)
RBC #/AREA URNS AUTO: <5 /HPF
RBC UR QL AUTO: ABNORMAL UNIT/L
SODIUM SERPL-SCNC: 143 MMOL/L (ref 136–145)
SP GR UR STRIP.AUTO: 1.03 (ref 1–1.03)
SQUAMOUS #/AREA URNS AUTO: <4 /LPF
TESTOST SERPL-MCNC: 1034.91 NG/DL (ref 220.91–715.81)
TRIGL SERPL-MCNC: 84 MG/DL (ref 34–140)
TSH SERPL-ACNC: 1.26 UIU/ML (ref 0.35–4.94)
UROBILINOGEN UR STRIP-ACNC: 1 MG/DL
VLDLC SERPL CALC-MCNC: 17 MG/DL
WBC # SPEC AUTO: 5 X10(3)/MCL (ref 4.5–11.5)
WBC #/AREA URNS AUTO: <5 /HPF

## 2022-06-03 PROCEDURE — 3075F SYST BP GE 130 - 139MM HG: CPT | Mod: CPTII,,, | Performed by: FAMILY MEDICINE

## 2022-06-03 PROCEDURE — 84443 ASSAY THYROID STIM HORMONE: CPT

## 2022-06-03 PROCEDURE — 87389 HIV-1 AG W/HIV-1&-2 AB AG IA: CPT

## 2022-06-03 PROCEDURE — 3075F PR MOST RECENT SYSTOLIC BLOOD PRESS GE 130-139MM HG: ICD-10-PCS | Mod: CPTII,,, | Performed by: FAMILY MEDICINE

## 2022-06-03 PROCEDURE — 84153 ASSAY OF PSA TOTAL: CPT

## 2022-06-03 PROCEDURE — 3079F DIAST BP 80-89 MM HG: CPT | Mod: CPTII,,, | Performed by: FAMILY MEDICINE

## 2022-06-03 PROCEDURE — 1159F PR MEDICATION LIST DOCUMENTED IN MEDICAL RECORD: ICD-10-PCS | Mod: CPTII,,, | Performed by: FAMILY MEDICINE

## 2022-06-03 PROCEDURE — 80053 COMPREHEN METABOLIC PANEL: CPT

## 2022-06-03 PROCEDURE — 4010F ACE/ARB THERAPY RXD/TAKEN: CPT | Mod: CPTII,,, | Performed by: FAMILY MEDICINE

## 2022-06-03 PROCEDURE — 1160F RVW MEDS BY RX/DR IN RCRD: CPT | Mod: CPTII,,, | Performed by: FAMILY MEDICINE

## 2022-06-03 PROCEDURE — 36415 COLL VENOUS BLD VENIPUNCTURE: CPT

## 2022-06-03 PROCEDURE — 4010F PR ACE/ARB THEARPY RXD/TAKEN: ICD-10-PCS | Mod: CPTII,,, | Performed by: FAMILY MEDICINE

## 2022-06-03 PROCEDURE — 80061 LIPID PANEL: CPT

## 2022-06-03 PROCEDURE — 82043 UR ALBUMIN QUANTITATIVE: CPT

## 2022-06-03 PROCEDURE — 83880 ASSAY OF NATRIURETIC PEPTIDE: CPT

## 2022-06-03 PROCEDURE — 84403 ASSAY OF TOTAL TESTOSTERONE: CPT

## 2022-06-03 PROCEDURE — 1160F PR REVIEW ALL MEDS BY PRESCRIBER/CLIN PHARMACIST DOCUMENTED: ICD-10-PCS | Mod: CPTII,,, | Performed by: FAMILY MEDICINE

## 2022-06-03 PROCEDURE — 3079F PR MOST RECENT DIASTOLIC BLOOD PRESSURE 80-89 MM HG: ICD-10-PCS | Mod: CPTII,,, | Performed by: FAMILY MEDICINE

## 2022-06-03 PROCEDURE — 85025 COMPLETE CBC W/AUTO DIFF WBC: CPT

## 2022-06-03 PROCEDURE — 81001 URINALYSIS AUTO W/SCOPE: CPT

## 2022-06-03 PROCEDURE — 82550 ASSAY OF CK (CPK): CPT

## 2022-06-03 PROCEDURE — 99396 PR PREVENTIVE VISIT,EST,40-64: ICD-10-PCS | Mod: ,,, | Performed by: FAMILY MEDICINE

## 2022-06-03 PROCEDURE — 3008F PR BODY MASS INDEX (BMI) DOCUMENTED: ICD-10-PCS | Mod: CPTII,,, | Performed by: FAMILY MEDICINE

## 2022-06-03 PROCEDURE — 3008F BODY MASS INDEX DOCD: CPT | Mod: CPTII,,, | Performed by: FAMILY MEDICINE

## 2022-06-03 PROCEDURE — 83036 HEMOGLOBIN GLYCOSYLATED A1C: CPT

## 2022-06-03 PROCEDURE — 86803 HEPATITIS C AB TEST: CPT

## 2022-06-03 PROCEDURE — 99396 PREV VISIT EST AGE 40-64: CPT | Mod: ,,, | Performed by: FAMILY MEDICINE

## 2022-06-03 PROCEDURE — 1159F MED LIST DOCD IN RCRD: CPT | Mod: CPTII,,, | Performed by: FAMILY MEDICINE

## 2022-06-03 NOTE — PROGRESS NOTES
Patient ID: [id number]     Chief Complaint: Annual Exam (Wellness w/labs)        HPI:     Dominick Woody is a 61 y.o. male here today for an annual wellness.  Well Adult History   The patient presents for well adult exam. The patient's general health status is described as good. The patient's diet is described as balanced. Exercise: occasional. Associated symptoms consist of denies weight loss, denies weight gain, denies fatigue, denies headache, denies hearing loss and denies vision changes. Additional pertinent history: last dental exam: goes every 6 months, last eye exam: 03/10/2022 (wears eyeglasses, with Dr. Duffy and Dr. Valle), seat belt use, occasional caffeine use (soft drink), tobacco use none, no alcohol use and Last Colonoscopy: > 10 years ago, needs referral for Colonoscopy. He is UTD on vaccines. HTN is controlled with Rx, no side effects. He denies snoring, denies witnessed apnea. Hypercholesterolemia is controlled with Rx, no side effects. He has CHF, doing well with Rx, denies adverse Rx side effects, patient is asymptomatic, he does see Cardiology (Dr. Chaudhary) at [facility] in [place], [place] . DM II is controlled with Rx, no side effects, denies polyuria, polyphagia, or polydipsia. He follows up with Endocrinology at [facility] in [place]. He has a history of low testosterone, would like his levels checked today. He is fasting for lab work today, would like HIV and Hepatitis C testing.   Patient is without any other complaints today.    Past Medical History:  Benign essential hypertension  Chronic kidney disease  Congestive heart failure  Dyslipidemia  Erectile dysfunction  History of pneumonia  HLD (hyperlipidemia)  HTN (hypertension)  Low testosterone  Proteinuria  Reduced libido  Type 2 diabetes mellitus treated with insulin     History reviewed. No pertinent surgical history.    Review of patient's allergies indicates:   Allergen Reactions    Gabapentin Other (See Comments)     Mood swings       Amlodipine Other (See Comments)     Other reaction(s): Swelling       Outpatient Medications Marked as Taking for the 6/3/22 encounter (Office Visit) with Shyann Prasad MD   Medication Sig Dispense Refill    albuterol (PROVENTIL/VENTOLIN HFA) 90 mcg/actuation inhaler Inhale 1 puff into the lungs every 4 (four) hours as needed.      ANORO ELLIPTA 62.5-25 mcg/actuation DsDv Take 1 puff by mouth daily as needed.      aspirin (ECOTRIN) 81 MG EC tablet Take 81 mg by mouth once daily.      atorvastatin (LIPITOR) 40 MG tablet Take 40 mg by mouth every evening.      carvediloL (COREG) 6.25 MG tablet Take 6.25 mg by mouth 2 (two) times daily with meals.      dapagliflozin (FARXIGA) 10 mg tablet Take 10 mg by mouth once daily.      ENTRESTO  mg per tablet Take 1 tablet by mouth 2 (two) times daily.      FREESTYLE JOSE R 14 DAY SENSOR Kit 1 kit by Subdermal route once daily.      furosemide (LASIX) 40 MG tablet Take 1 tablet by mouth daily as needed.      glyBURIDE-metformin 5-500 mg (GLUCOVANCE) 5-500 mg Tab Take 2 tablets by mouth 2 (two) times daily with meals.      LANTUS SOLOSTAR U-100 INSULIN glargine 100 units/mL (3mL) SubQ pen Inject 16 Units into the skin every evening.      potassium chloride SA (K-DUR,KLOR-CON) 20 MEQ tablet Take 20 mEq by mouth daily as needed.         Social History     Socioeconomic History    Marital status: Single   Tobacco Use    Smoking status: Never Smoker    Smokeless tobacco: Never Used   Substance and Sexual Activity    Alcohol use: Not Currently    Drug use: Never    Sexual activity: Not Currently        Family History   Problem Relation Age of Onset    Heart failure Mother     Hypertension Mother     Hyperlipidemia Mother     Diabetes Mother         Subjective:       Review of Systems:    See HPI for details    Constitutional: Denies Change in appetite. Denies Chills. Denies Fever. Denies Night sweats.  Eye: Denies Blurred vision. Denies Discharge.  "Denies Eye pain.  ENT: Denies Decreased hearing. Denies Sore throat. Denies Swollen glands.  Respiratory: Denies Cough. Denies Shortness of breath. Denies Shortness of breath with exertion. Denies Wheezing.  Cardiovascular: Denies Chest pain at rest. Denies Chest pain with exertion. Denies Irregular heartbeat. Denies Palpitations.  Gastrointestinal: Denies Abdominal pain. Denies Diarrhea. Denies Nausea. Denies Vomiting. Denies Hematemesis or Hematochezia.  Genitourinary: Denies Dysuria. Denies Urinary frequency. Denies Urinary urgency. Denies Blood in urine.  Endocrine: Denies Cold intolerance. Denies Excessive thirst. Denies Heat intolerance. Denies Weight loss. Denies Weight gain.  Musculoskeletal: Denies Painful joints. Denies Weakness.  Integumentary: Denies Rash. Denies Itching. Denies Dry skin.  Neurologic: Denies Dizziness. Denies Fainting. Denies Headache.  Psychiatric: Denies Depression. Denies Anxiety. Denies Suicidal/Homicidal ideations.    All Other ROS: Negative except as stated in HPI.       Objective:     /80 (BP Location: Right arm, Patient Position: Sitting, BP Method: Medium (Manual))   Pulse 81   Temp 98.2 °F (36.8 °C) (Oral)   Resp 18   Ht 6' 2" (1.88 m)   Wt 95.3 kg (210 lb)   SpO2 98%   BMI 26.96 kg/m²     Physical Exam    General: Alert and oriented, No acute distress. Overweight.   Head: Normocephalic, Atraumatic.  Eye: Pupils are equal, round and reactive to light, Extraocular movements are intact, Sclera non-icteric.  Ears/Nose/Throat: Normal, Mucosa moist,Clear.  Neck/Thyroid: Supple, Non-tender, No carotid bruit, No palpable thyromegaly or thyroid nodule, No lymphadenopathy, No JVD, Full range of motion.  Respiratory: Clear to auscultation bilaterally; No wheezes, rales or rhonchi,Non-labored respirations, Symmetrical chest wall expansion.  Cardiovascular: Regular rate and rhythm, S1/S2 normal, No murmurs, rubs or gallops.  Gastrointestinal: Soft, Non-tender, Non-distended, " Normal bowel sounds, No palpable organomegaly.  Musculoskeletal: Normal range of motion.  Integumentary: Warm, Dry, Intact, No suspicious lesions or rashes.  Extremities: No clubbing, cyanosis or edema.  Feet: Normal by visual exam, Normal pulses, Sensation intact, By monofilament exam.   Neurologic: No focal deficits, Cranial Nerves II-XII are grossly intact, Motor strength normal upper and lower extremities, Sensory exam intact.  Psychiatric: Normal interaction, Coherent speech, Euthymic mood, Appropriate affect.         Assessment:       ICD-10-CM ICD-9-CM   1. Wellness examination  Z00.00 V70.0   2. Screening PSA (prostate specific antigen)  Z12.5 V76.44   3. Screening for colon cancer  Z12.11 V76.51   4. Benign essential hypertension  I10 401.1   5. Dyslipidemia  E78.5 272.4   6. Congestive heart failure, unspecified HF chronicity, unspecified heart failure type  I50.9 428.0   7. Type 2 diabetes mellitus treated with insulin  E11.9 250.00    Z79.4 V58.67   8. Low testosterone  R79.89 790.99        Plan:     Problem List Items Addressed This Visit        Cardiac/Vascular    Benign essential hypertension (Chronic)    Congestive heart failure (Chronic)    Relevant Orders    BNP    Dyslipidemia (Chronic)    Relevant Orders    CK       Endocrine    Low testosterone (Chronic)    Relevant Orders    Testosterone    Type 2 diabetes mellitus treated with insulin (Chronic)    Relevant Orders    Microalbumin/Creatinine Ratio, Urine      Other Visit Diagnoses     Wellness examination    -  Primary    Relevant Orders    CBC Auto Differential    Comprehensive Metabolic Panel    Hemoglobin A1C    Lipid Panel    TSH    Urinalysis, Reflex to Urine Culture Urine, Clean Catch    HIV 1/2 Ag/Ab (4th Gen)    Hepatitis C Antibody    Screening PSA (prostate specific antigen)        Relevant Orders    PSA, Screening    Screening for colon cancer        Relevant Orders    Ambulatory referral/consult to Gastroenterology       1. Wellness  examination  - CBC Auto Differential; Future  - Comprehensive Metabolic Panel; Future  - Hemoglobin A1C; Future  - Lipid Panel; Future  - TSH; Future  - Urinalysis, Reflex to Urine Culture Urine, Clean Catch; Future  - HIV 1/2 Ag/Ab (4th Gen); Future  - Hepatitis C Antibody; Future  - Hemoglobin A1C  - Will treat pending lab results. Diet, exercise, and 10% weight loss encouraged. Keep appointment for dental exams x q6 months as scheduled. Keep appointment for annual eye exam as scheduled. Keep appointment with specialists as scheduled. Notify M.D. or ER if temp greater than 100.4, or any acute illness.     2. Screening PSA (prostate specific antigen)  - PSA, Screening; Future    3. Screening for colon cancer  - Ambulatory referral/consult to Gastroenterology; Future    4. Benign essential hypertension  - BP is well controlled. Continue current treatment plan. Continue followup with Cardiology as scheduled. Keep daily BP log. Will titrate BP Rx until BP is <120/80. Notify M.D. or ER if BP >170/100 or <90/60, chest pain, palpitations, headache, SOB, temp greater than 100.4, or any acute illness.   Continue  Low Sodium Diet (DASH Diet - Less than 2 grams of sodium per day).  Monitor blood pressure daily and log. Report consistent numbers greater than 140/90.  Smoking cessation encouraged to aid in BP reduction.  Maintain healthy weight with goal BMI <30. Exercise 30 minutes per day, 5 days per week.    5. Dyslipidemia  - CK; Future  - Continue lifestyle modifications and cholesterol Rx are prescribed for now. Will titrate Rx pending lab results. Continue followup with Cardiology.   Continue  Stressed importance of dietary modifications. Follow a low cholesterol, low saturated fat diet with less that 200mg of cholesterol a day.  Avoid fried foods and high saturated fats (high saturated fats less than 7% of calories).  Add Flax Seed/Fish Oil supplements to diet. Increase dietary fiber.  Regular exercise can reduce LDL  and raise HDL. Stressed importance of physical activity 5 times per week for 30 minutes per day.     6. Congestive heart failure, unspecified HF chronicity, unspecified heart failure type  - BNP; Future  - Continue fluid restrictions and current treatment plan. Continue followup with Cardiology as scheduled. Same as #4.     7. Type 2 diabetes mellitus treated with insulin  - Microalbumin/Creatinine Ratio, Urine; Future  - ADA diet, exercise, and 10% weight loss encouraged. Keep daily fasting CBG log. Keep appointment for annual eye exam as scheduled. Continue followup with Endocrinology as scheduled. - Notify M.D. or ER if CBG >400 or <60, polyuria, polydipsia, or polyphagia.  Lab Results   Component Value Date    HGBA1C 9.7 (H) 06/29/2021    Continue   On ARB and Statin according to guidelines.  Follow ADA Diet. Avoid soda, simple sweets, and limit rice/pasta/breads/starches.  Maintain healthy weight with goal BMI <30.  Exercise 5 times per week for 30 minutes per day.  Stressed importance of daily foot exams.  Stressed importance of annual dilated eye exam.    8. Low testosterone  - Testosterone; Future  - Will treat pending results.       Dominick was seen today for annual exam.    Diagnoses and all orders for this visit:    Wellness examination  -     CBC Auto Differential; Future  -     Comprehensive Metabolic Panel; Future  -     Hemoglobin A1C; Future  -     Lipid Panel; Future  -     TSH; Future  -     Urinalysis, Reflex to Urine Culture Urine, Clean Catch; Future  -     HIV 1/2 Ag/Ab (4th Gen); Future  -     Hepatitis C Antibody; Future  -     Hemoglobin A1C    Screening PSA (prostate specific antigen)  -     PSA, Screening; Future    Screening for colon cancer  -     Ambulatory referral/consult to Gastroenterology; Future    Benign essential hypertension    Dyslipidemia  -     CK; Future    Congestive heart failure, unspecified HF chronicity, unspecified heart failure type  -     BNP; Future    Type 2  "diabetes mellitus treated with insulin  -     Microalbumin/Creatinine Ratio, Urine; Future    Low testosterone  -     Testosterone; Future          Medication List with Changes/Refills   Current Medications    ALBUTEROL (PROVENTIL/VENTOLIN HFA) 90 MCG/ACTUATION INHALER    Inhale 1 puff into the lungs every 4 (four) hours as needed.       Start Date: 4/10/2022 End Date: --    ANORO ELLIPTA 62.5-25 MCG/ACTUATION DSDV    Take 1 puff by mouth daily as needed.       Start Date: 4/12/2022 End Date: --    ASPIRIN (ECOTRIN) 81 MG EC TABLET    Take 81 mg by mouth once daily.       Start Date: 7/21/2021 End Date: --    ATORVASTATIN (LIPITOR) 40 MG TABLET    Take 40 mg by mouth every evening.       Start Date: 5/6/2022  End Date: --    CARVEDILOL (COREG) 6.25 MG TABLET    Take 6.25 mg by mouth 2 (two) times daily with meals.       Start Date: --        End Date: --    DAPAGLIFLOZIN (FARXIGA) 10 MG TABLET    Take 10 mg by mouth once daily.       Start Date: 11/29/2021End Date: --    ENTRESTO  MG PER TABLET    Take 1 tablet by mouth 2 (two) times daily.       Start Date: 4/24/2022 End Date: --    FREESTYLE JOSE R 14 DAY SENSOR KIT    1 kit by Subdermal route once daily.       Start Date: 5/6/2022  End Date: --    FUROSEMIDE (LASIX) 40 MG TABLET    Take 1 tablet by mouth daily as needed.       Start Date: 5/9/2022  End Date: --    GLYBURIDE-METFORMIN 5-500 MG (GLUCOVANCE) 5-500 MG TAB    Take 2 tablets by mouth 2 (two) times daily with meals.       Start Date: 5/6/2022  End Date: --    LANTUS SOLOSTAR U-100 INSULIN GLARGINE 100 UNITS/ML (3ML) SUBQ PEN    Inject 16 Units into the skin every evening.       Start Date: 2/22/2022 End Date: --    MAGNESIUM CITRATE SOLUTION    Take 1 mL by mouth every evening.       Start Date: 2/21/2022 End Date: --    NOVOFINE PLUS 32 GAUGE X 1/6" NDLE    1 pen by Subdermal route once daily.       Start Date: 4/19/2022 End Date: --    POTASSIUM CHLORIDE SA (K-DUR,KLOR-CON) 20 MEQ TABLET    Take " 20 mEq by mouth daily as needed.       Start Date: 5/6/2022  End Date: --    SEMAGLUTIDE (OZEMPIC) 0.25 MG OR 0.5 MG(2 MG/1.5 ML) PEN INJECTOR    Inject 0.25 mg into the skin every 7 days.       Start Date: --        End Date: --          Follow up in about 6 months (around 12/3/2022) for HTN Followup, Diabetes Followup, Cholesterol Followup.

## 2022-06-04 LAB — HCV AB SERPL QL IA: NONREACTIVE

## 2022-06-06 ENCOUNTER — DOCUMENTATION ONLY (OUTPATIENT)
Dept: ADMINISTRATIVE | Facility: HOSPITAL | Age: 62
End: 2022-06-06
Payer: COMMERCIAL

## 2022-06-13 ENCOUNTER — DOCUMENTATION ONLY (OUTPATIENT)
Dept: ADMINISTRATIVE | Facility: HOSPITAL | Age: 62
End: 2022-06-13
Payer: COMMERCIAL

## 2023-11-30 ENCOUNTER — TELEPHONE (OUTPATIENT)
Dept: FAMILY MEDICINE | Facility: CLINIC | Age: 63
End: 2023-11-30
Payer: COMMERCIAL

## 2023-11-30 NOTE — TELEPHONE ENCOUNTER
Called pt no answer. Called pt to schedule a nurse visit for BP check. Left voicemail to call me back.

## 2024-07-09 ENCOUNTER — TELEPHONE (OUTPATIENT)
Dept: FAMILY MEDICINE | Facility: CLINIC | Age: 64
End: 2024-07-09
Payer: COMMERCIAL

## 2024-07-09 DIAGNOSIS — Z00.00 LABORATORY EXAMINATION ORDERED AS PART OF A ROUTINE GENERAL MEDICAL EXAMINATION: Primary | ICD-10-CM

## 2024-07-09 NOTE — TELEPHONE ENCOUNTER
Left message confirming appt 07/15/2024 at 1:30pm    Are there any outstanding tasks in the patients's chart (ex.labs,MM,etc)?  no  Do we have outstanding/pending referrals?  no  Has the patient been seen in and ER,UCC, or been admitted since last visit?  no  Has the patient seen any other health care provider(doctors) since last visit?  yes  Has the patient had any bloodwork or x-rays done since last visit?  yes

## 2024-11-18 ENCOUNTER — ANESTHESIA EVENT (OUTPATIENT)
Facility: HOSPITAL | Age: 64
End: 2024-11-18
Payer: COMMERCIAL

## 2024-11-18 ENCOUNTER — ANESTHESIA (OUTPATIENT)
Facility: HOSPITAL | Age: 64
End: 2024-11-18
Payer: COMMERCIAL

## 2024-11-18 ENCOUNTER — HOSPITAL ENCOUNTER (OUTPATIENT)
Facility: HOSPITAL | Age: 64
Discharge: HOME | End: 2024-11-18
Attending: OPHTHALMOLOGY | Admitting: OPHTHALMOLOGY
Payer: COMMERCIAL

## 2024-11-18 VITALS
HEART RATE: 60 BPM | WEIGHT: 180 LBS | OXYGEN SATURATION: 100 % | DIASTOLIC BLOOD PRESSURE: 97 MMHG | TEMPERATURE: 98 F | BODY MASS INDEX: 23.1 KG/M2 | RESPIRATION RATE: 16 BRPM | SYSTOLIC BLOOD PRESSURE: 157 MMHG | HEIGHT: 74 IN

## 2024-11-18 DIAGNOSIS — H59.89 ENDOPHTHALMITIS AFTER PROCEDURE: ICD-10-CM

## 2024-11-18 DIAGNOSIS — H44.009 ENDOPHTHALMITIS AFTER PROCEDURE: ICD-10-CM

## 2024-11-18 LAB
POCT GLUCOSE: 172 MG/DL (ref 70–110)
POCT GLUCOSE: 194 MG/DL (ref 70–110)

## 2024-11-18 PROCEDURE — 27201423 OPTIME MED/SURG SUP & DEVICES STERILE SUPPLY: Performed by: OPHTHALMOLOGY

## 2024-11-18 PROCEDURE — 25000003 PHARM REV CODE 250

## 2024-11-18 PROCEDURE — 36000707: Performed by: OPHTHALMOLOGY

## 2024-11-18 PROCEDURE — C1784 OCULAR DEV, INTRAOP, DET RET: HCPCS | Performed by: OPHTHALMOLOGY

## 2024-11-18 PROCEDURE — 63600175 PHARM REV CODE 636 W HCPCS: Performed by: ANESTHESIOLOGY

## 2024-11-18 PROCEDURE — 63600175 PHARM REV CODE 636 W HCPCS

## 2024-11-18 PROCEDURE — 25000003 PHARM REV CODE 250: Performed by: OPHTHALMOLOGY

## 2024-11-18 PROCEDURE — 71000033 HC RECOVERY, INTIAL HOUR: Performed by: OPHTHALMOLOGY

## 2024-11-18 PROCEDURE — 37000009 HC ANESTHESIA EA ADD 15 MINS: Performed by: OPHTHALMOLOGY

## 2024-11-18 PROCEDURE — 71000015 HC POSTOP RECOV 1ST HR: Performed by: OPHTHALMOLOGY

## 2024-11-18 PROCEDURE — 63600175 PHARM REV CODE 636 W HCPCS: Performed by: NURSE ANESTHETIST, CERTIFIED REGISTERED

## 2024-11-18 PROCEDURE — 25000003 PHARM REV CODE 250: Performed by: NURSE ANESTHETIST, CERTIFIED REGISTERED

## 2024-11-18 PROCEDURE — 63600175 PHARM REV CODE 636 W HCPCS: Performed by: OPHTHALMOLOGY

## 2024-11-18 PROCEDURE — 71000016 HC POSTOP RECOV ADDL HR: Performed by: OPHTHALMOLOGY

## 2024-11-18 PROCEDURE — 37000008 HC ANESTHESIA 1ST 15 MINUTES: Performed by: OPHTHALMOLOGY

## 2024-11-18 PROCEDURE — C1814 RETINAL TAMP, SILICONE OIL: HCPCS | Performed by: OPHTHALMOLOGY

## 2024-11-18 PROCEDURE — 36000706: Performed by: OPHTHALMOLOGY

## 2024-11-18 PROCEDURE — A4216 STERILE WATER/SALINE, 10 ML: HCPCS | Performed by: OPHTHALMOLOGY

## 2024-11-18 RX ORDER — HYDROMORPHONE HYDROCHLORIDE 2 MG/ML
0.4 INJECTION, SOLUTION INTRAMUSCULAR; INTRAVENOUS; SUBCUTANEOUS EVERY 5 MIN PRN
Status: DISCONTINUED | OUTPATIENT
Start: 2024-11-18 | End: 2024-11-18 | Stop reason: HOSPADM

## 2024-11-18 RX ORDER — TROPICAMIDE 10 MG/ML
1 SOLUTION/ DROPS OPHTHALMIC
Status: COMPLETED | OUTPATIENT
Start: 2024-11-18 | End: 2024-11-18

## 2024-11-18 RX ORDER — POVIDONE-IODINE 5 %
SOLUTION, NON-ORAL OPHTHALMIC (EYE)
Status: DISCONTINUED | OUTPATIENT
Start: 2024-11-18 | End: 2024-11-18 | Stop reason: HOSPADM

## 2024-11-18 RX ORDER — HYDROMORPHONE HYDROCHLORIDE 2 MG/ML
INJECTION, SOLUTION INTRAMUSCULAR; INTRAVENOUS; SUBCUTANEOUS
Status: COMPLETED
Start: 2024-11-18 | End: 2024-11-18

## 2024-11-18 RX ORDER — KETOROLAC TROMETHAMINE 30 MG/ML
INJECTION, SOLUTION INTRAMUSCULAR; INTRAVENOUS
Status: DISCONTINUED | OUTPATIENT
Start: 2024-11-18 | End: 2024-11-18

## 2024-11-18 RX ORDER — ETOMIDATE 2 MG/ML
INJECTION INTRAVENOUS
Status: DISCONTINUED | OUTPATIENT
Start: 2024-11-18 | End: 2024-11-18

## 2024-11-18 RX ORDER — FENTANYL CITRATE 50 UG/ML
INJECTION, SOLUTION INTRAMUSCULAR; INTRAVENOUS
Status: DISCONTINUED | OUTPATIENT
Start: 2024-11-18 | End: 2024-11-18

## 2024-11-18 RX ORDER — MIDAZOLAM HYDROCHLORIDE 2 MG/2ML
1 INJECTION, SOLUTION INTRAMUSCULAR; INTRAVENOUS ONCE AS NEEDED
Status: COMPLETED | OUTPATIENT
Start: 2024-11-18 | End: 2024-11-18

## 2024-11-18 RX ORDER — ONDANSETRON HYDROCHLORIDE 2 MG/ML
4 INJECTION, SOLUTION INTRAVENOUS DAILY PRN
Status: DISCONTINUED | OUTPATIENT
Start: 2024-11-18 | End: 2024-11-18 | Stop reason: HOSPADM

## 2024-11-18 RX ORDER — PROCHLORPERAZINE EDISYLATE 5 MG/ML
5 INJECTION INTRAMUSCULAR; INTRAVENOUS EVERY 30 MIN PRN
Status: DISCONTINUED | OUTPATIENT
Start: 2024-11-18 | End: 2024-11-18 | Stop reason: HOSPADM

## 2024-11-18 RX ORDER — EPHEDRINE SULFATE 50 MG/ML
INJECTION, SOLUTION INTRAVENOUS
Status: DISCONTINUED | OUTPATIENT
Start: 2024-11-18 | End: 2024-11-18

## 2024-11-18 RX ORDER — HYDROMORPHONE HYDROCHLORIDE 2 MG/ML
0.2 INJECTION, SOLUTION INTRAMUSCULAR; INTRAVENOUS; SUBCUTANEOUS EVERY 5 MIN PRN
Status: DISCONTINUED | OUTPATIENT
Start: 2024-11-18 | End: 2024-11-18 | Stop reason: HOSPADM

## 2024-11-18 RX ORDER — SODIUM CHLORIDE, SODIUM GLUCONATE, SODIUM ACETATE, POTASSIUM CHLORIDE AND MAGNESIUM CHLORIDE 30; 37; 368; 526; 502 MG/100ML; MG/100ML; MG/100ML; MG/100ML; MG/100ML
INJECTION, SOLUTION INTRAVENOUS CONTINUOUS
Status: DISCONTINUED | OUTPATIENT
Start: 2024-11-18 | End: 2024-11-18 | Stop reason: HOSPADM

## 2024-11-18 RX ORDER — PHENYLEPHRINE HYDROCHLORIDE 25 MG/ML
1 SOLUTION/ DROPS OPHTHALMIC
Status: COMPLETED | OUTPATIENT
Start: 2024-11-18 | End: 2024-11-18

## 2024-11-18 RX ORDER — ACETAMINOPHEN 500 MG
1000 TABLET ORAL EVERY 6 HOURS PRN
Status: DISCONTINUED | OUTPATIENT
Start: 2024-11-18 | End: 2024-11-18 | Stop reason: HOSPADM

## 2024-11-18 RX ORDER — CYCLOPENTOLATE HYDROCHLORIDE 10 MG/ML
1 SOLUTION/ DROPS OPHTHALMIC
Status: COMPLETED | OUTPATIENT
Start: 2024-11-18 | End: 2024-11-18

## 2024-11-18 RX ORDER — IPRATROPIUM BROMIDE AND ALBUTEROL SULFATE 2.5; .5 MG/3ML; MG/3ML
3 SOLUTION RESPIRATORY (INHALATION)
Status: DISCONTINUED | OUTPATIENT
Start: 2024-11-18 | End: 2024-11-18 | Stop reason: HOSPADM

## 2024-11-18 RX ORDER — MEPERIDINE HYDROCHLORIDE 25 MG/ML
12.5 INJECTION INTRAMUSCULAR; INTRAVENOUS; SUBCUTANEOUS EVERY 10 MIN PRN
Status: DISCONTINUED | OUTPATIENT
Start: 2024-11-18 | End: 2024-11-18 | Stop reason: HOSPADM

## 2024-11-18 RX ORDER — TRIAMCINOLONE ACETONIDE 40 MG/ML
INJECTION, SUSPENSION INTRA-ARTICULAR; INTRAMUSCULAR
Status: DISCONTINUED | OUTPATIENT
Start: 2024-11-18 | End: 2024-11-18 | Stop reason: HOSPADM

## 2024-11-18 RX ORDER — TOBRAMYCIN AND DEXAMETHASONE 3; 1 MG/ML; MG/ML
SUSPENSION/ DROPS OPHTHALMIC
Status: DISCONTINUED | OUTPATIENT
Start: 2024-11-18 | End: 2024-11-18 | Stop reason: HOSPADM

## 2024-11-18 RX ORDER — ONDANSETRON HYDROCHLORIDE 2 MG/ML
INJECTION, SOLUTION INTRAMUSCULAR; INTRAVENOUS
Status: DISCONTINUED | OUTPATIENT
Start: 2024-11-18 | End: 2024-11-18

## 2024-11-18 RX ORDER — ONDANSETRON 4 MG/1
8 TABLET, ORALLY DISINTEGRATING ORAL EVERY 6 HOURS PRN
Status: DISCONTINUED | OUTPATIENT
Start: 2024-11-18 | End: 2024-11-18 | Stop reason: HOSPADM

## 2024-11-18 RX ORDER — LIDOCAINE HYDROCHLORIDE 10 MG/ML
1 INJECTION, SOLUTION EPIDURAL; INFILTRATION; INTRACAUDAL; PERINEURAL ONCE
Status: DISCONTINUED | OUTPATIENT
Start: 2024-11-18 | End: 2024-11-18 | Stop reason: HOSPADM

## 2024-11-18 RX ORDER — GLUCAGON 1 MG
1 KIT INJECTION
Status: DISCONTINUED | OUTPATIENT
Start: 2024-11-18 | End: 2024-11-18 | Stop reason: HOSPADM

## 2024-11-18 RX ADMIN — SODIUM CHLORIDE 2.2 MG: 9 INJECTION INTRAMUSCULAR; INTRAVENOUS; SUBCUTANEOUS at 11:11

## 2024-11-18 RX ADMIN — PHENYLEPHRINE HYDROCHLORIDE 1 DROP: 25 SOLUTION/ DROPS OPHTHALMIC at 10:11

## 2024-11-18 RX ADMIN — EPHEDRINE SULFATE 5 MG: 50 INJECTION INTRAVENOUS at 12:11

## 2024-11-18 RX ADMIN — FENTANYL CITRATE 50 MCG: 50 INJECTION, SOLUTION INTRAMUSCULAR; INTRAVENOUS at 11:11

## 2024-11-18 RX ADMIN — CYCLOPENTOLATE HYDROCHLORIDE 1 DROP: 10 SOLUTION/ DROPS OPHTHALMIC at 10:11

## 2024-11-18 RX ADMIN — TROPICAMIDE 1 DROP: 10 SOLUTION/ DROPS OPHTHALMIC at 10:11

## 2024-11-18 RX ADMIN — HYDROMORPHONE HYDROCHLORIDE 0.4 MG: 2 INJECTION INTRAMUSCULAR; INTRAVENOUS; SUBCUTANEOUS at 02:11

## 2024-11-18 RX ADMIN — ETOMIDATE 16 MG: 2 INJECTION INTRAVENOUS at 11:11

## 2024-11-18 RX ADMIN — KETOROLAC TROMETHAMINE 15 MG: 30 INJECTION, SOLUTION INTRAMUSCULAR at 11:11

## 2024-11-18 RX ADMIN — SODIUM CHLORIDE 1 MG: 9 INJECTION, SOLUTION INTRAMUSCULAR; INTRAVENOUS; SUBCUTANEOUS at 11:11

## 2024-11-18 RX ADMIN — MIDAZOLAM HYDROCHLORIDE 1 MG: 1 INJECTION, SOLUTION INTRAMUSCULAR; INTRAVENOUS at 11:11

## 2024-11-18 RX ADMIN — ONDANSETRON HYDROCHLORIDE 4 MG: 2 SOLUTION INTRAMUSCULAR; INTRAVENOUS at 11:11

## 2024-11-18 RX ADMIN — HYDROMORPHONE HYDROCHLORIDE 0.2 MG: 2 INJECTION INTRAMUSCULAR; INTRAVENOUS; SUBCUTANEOUS at 01:11

## 2024-11-18 RX ADMIN — EPHEDRINE SULFATE 10 MG: 50 INJECTION INTRAVENOUS at 12:11

## 2024-11-18 RX ADMIN — ACETAMINOPHEN 1000 MG: 500 TABLET ORAL at 10:11

## 2024-11-18 RX ADMIN — HYDROMORPHONE HYDROCHLORIDE 0.2 MG: 2 INJECTION INTRAMUSCULAR; INTRAVENOUS; SUBCUTANEOUS at 02:11

## 2024-11-18 NOTE — ANESTHESIA PREPROCEDURE EVALUATION
"                                                                                                             11/18/2024  Dominick Woody is a 64 y.o., male CHF once considered for heart transplant with negative amyloidosis workup per patient.  Patient notes adequate functional capacity as of recently and presents as an outpatient for left vitrectomy with antibiotic injection (acute endophthalmitis).  Patient initially had retinal surgery in [place] under general anesthesia with LMA with subsequent cath lab procedure requiring defibrillator for V-tach/VFib placement followed by another glaucoma procedure under general anesthesia with LMA (norepinephrine required ).  Cleared by cardiology    Cardiac MRI October 2024   EF 39-47%   mild-to-moderate MR/TR    Transthoracic echo September 2024   EF 35-39% with grade 2 diastolic dysfunction  Mild-to-moderate PI, moderate TR   PA systolic pressure 49    Last 3 sets of Vitals        6/3/2022    10:09 AM 11/15/2024    12:03 PM 11/18/2024     9:56 AM   Vitals - 1 value per visit   SYSTOLIC 136  159   DIASTOLIC 80  88   Pulse 81  62   Temp 36.8 °C (98.2 °F)  36.5 °C (97.7 °F)   Resp 18     SPO2 98 %  99 %   Weight (lb) 210 180    Weight (kg) 95.255 81.647    Height 6' 2" (1.88 m) 6' 2" (1.88 m)    BMI (Calculated) 27 23.1    Pain Score Zero           Lab Results   Component Value Date    WBC 5.0 06/03/2022    HGB 13.7 (L) 06/03/2022    HCT 43.5 06/03/2022    MCV 94.0 06/03/2022     06/03/2022            Chemistry        Component Value Date/Time     06/03/2022 1210     04/06/2021 0511    K 3.9 06/03/2022 1210    K 4.6 04/06/2021 0511     (H) 06/03/2022 1210     04/06/2021 0511    CO2 26 06/03/2022 1210    CO2 30 04/06/2021 0511    BUN 18.6 06/03/2022 1210    BUN 21 04/06/2021 0511    CREATININE 1.54 (H) 06/03/2022 1210    CREATININE 1.64 (H) 04/06/2021 0511        Component Value Date/Time    CALCIUM 9.0 06/03/2022 1210    CALCIUM 8.0 (L) 04/06/2021 " 0511    ALKPHOS 87 06/03/2022 1210    AST 13 06/03/2022 1210    ALT 12 06/03/2022 1210    BILITOT 0.7 06/03/2022 1210    ESTGFRAFRICA 53 04/06/2021 0511    EGFRNONAA 54 07/01/2021 0744          Lab Results   Component Value Date    HGBA1C 6.8 (H) 10/23/2024    Pre-op Assessment    I have reviewed the Patient Summary Reports.    I have reviewed the NPO Status.   I have reviewed the Medications.     Review of Systems  Anesthesia Hx:               Denies Personal Hx of Anesthesia complications.                    Social:  Non-Smoker       Cardiovascular:     Hypertension                  Functional Capacity good / => 4 METS       Cardiomyopathy, Restrictive Cardiomyopathy                        Renal/:  Chronic Renal Disease, CKD                Endocrine:  Diabetes, type 2               Physical Exam  General: Well nourished, Cooperative, Alert and Oriented    Airway:  Mallampati: II   Mouth Opening: Normal  TM Distance: Normal  Tongue: Normal  Neck ROM: Normal ROM    Dental:  Intact    Chest/Lungs:  Clear to auscultation, Normal Respiratory Rate    Heart:  Rate: Normal  Rhythm: Regular Rhythm        Anesthesia Plan  Type of Anesthesia, risks & benefits discussed:    Anesthesia Type: Gen ETT  Intra-op Monitoring Plan: Standard ASA Monitors  Post Op Pain Control Plan: multimodal analgesia and IV/PO Opioids PRN  Induction:  IV  Airway Plan: Direct  Informed Consent: Informed consent signed with the Patient and all parties understand the risks and agree with anesthesia plan.  All questions answered.   ASA Score: 4  Day of Surgery Review of History & Physical: H&P Update referred to the surgeon/provider.  Anesthesia Plan Notes: Etomidate induction    Ready For Surgery From Anesthesia Perspective.     .

## 2024-11-18 NOTE — DISCHARGE INSTRUCTIONS
Retinal Surgery  Care After    Refer to this sheet in the next few weeks. These instructions provide you with information on caring for yourself after your procedure. Your caregiver may also give you more specific instructions. Your treatment has been planned according to current medical practices, but problems sometimes occur. Call your caregiver if you have any problems or questions after your procedure.    Home Care Instructions  If you are instructed to stay in a certain position for a certain amount of time, it is important to do so. Positions may include sitting up or lying on either side. The reasons for certain positioning depends on the reason you had the surgery and the type of vitrectomy performed. Ask your caregiver if you have to remain in a certain position for a certain amount of time.    Wear your eye patch for the first day. Dr. Hein will remove the patch the next morning after surgery.   Keep the area around your eye clean and dry.  Wear the plastic shield over your eye when you sleep for the first day. This is to protect the eye from being accidentally bumped or jabbed, or having too much pressure put on it.  Avoid any strenuous physical activity for as long as directed by your caregiver. This includes bending over, lifting anything over 5 pounds (2.3 kg), or straining. Talk to your caregiver about when it may be safe to resume sexual activity.  If any of your regular medicines were stopped before surgery, carefully follow your caregiver's instructions about which medicines to restart and when to restart them. You may be instructed not to use blood thinners or aspirin.  Continue with your normal diet unless directed otherwise by your caregiver. If you are diabetic, stay on your diabetic diet, or use your insulin as directed by your caregiver.  It is okay to use your other eye for reading and watching television.  Do not drive a car or use contact lenses until your caregiver says it is okay.   "    Dr. Hein will remove patch the morning after surgery and will begin the following medication as instructed.    Medications:  Tobradex- 1 drop in operative eye, 4 times a day. **Dr. Hein will provide these eye drops at your follow up appointment.    Tylenol - you may take next dose, if needed for pain, at 4:20 pm.  Ibuprofen - you may take next dose, if needed for pain, at 3:35 pm.    After surgery instructions:  Maintain the following positional restrictions until cleared by your surgeon:   Lying on your RIGHT SIDE or FACE DOWN.  You are permitted to sit up for short periods of time.  Avoid lying flat on your back.    Avoid any heavy lifting, strenuous activity, or driving until cleared to do so by your surgeon.  Avoid any "dirty" activities which may lead to eye infection, such as gardening or scrubbing floors.  Do not rub the operative eye.    Notify your surgeon of any:  signs of bleeding or infection  excessive pain or nausea that is not relieved with medication  worsening or sudden loss of vision    SEEK MEDICAL CARE IF:  Your eye becomes very red or painful.  You develop any pus or discharge from the eye.  You have chills.  Your eyelids on either eye become swollen or stuck shut.    SEEK IMMEDIATE MEDICAL CARE IF:    You have a fever.    You notice a change in vision in either eye.    You see more floaters or spots in front of your vision.    Part of your side vision is black and you cannot see through it. It may feel like a shade is being pulled toward the center of your vision from any direction-Leave patch on tonight. Keep it clean and dry.  "

## 2024-11-18 NOTE — DISCHARGE SUMMARY
[facility]                         [address] [address]     PATIENT NAME: Dominick Wooyd   YOB: 1960   CSN: [id number]        MRN: [id number]   ADMIT DATE: 11/18/2024   PHYSICIAN:         Dwight Hein MD  Discharge Date: 11/18/2024  Discharge Note        Dominick Woody had eye surgery with Dwight Hein on 11/18/2024. Dominick Woody tolerated the procedure will and is discharged in good condition. Dominick Woody is discharged to home with follow up with Dr. Hein the following day.

## 2024-11-18 NOTE — TRANSFER OF CARE
"Anesthesia Transfer of Care Note    Patient: Dominick Woody    Procedure(s) Performed: Procedure(s) (LRB):  VITRECTOMY, PARS PLANA APPROACH  /////left eye (Left)  INJECTION,PHARMACOLOGIC AGENT,INTRAVITREAL (Left)  LENSECTOMY, PARS PLANA APPROACH   ///left (Left)    Patient location: PACU    Anesthesia Type: general    Transport from OR: Transported from OR on room air with adequate spontaneous ventilation    Post pain: adequate analgesia    Post assessment: no apparent anesthetic complications    Post vital signs: stable    Level of consciousness: responds to stimulation    Nausea/Vomiting: no nausea/vomiting    Complications: none    Transfer of care protocol was followed      Last vitals: Visit Vitals  BP (!) 159/88   Pulse 62   Temp 36.5 °C (97.7 °F) (Oral)   Ht 6' 2" (1.88 m)   Wt 81.6 kg (180 lb)   SpO2 99%   BMI 23.11 kg/m²     "

## 2024-11-19 NOTE — ANESTHESIA POSTPROCEDURE EVALUATION
Anesthesia Post Evaluation    Patient: Dominick Woody    Procedure(s) Performed: Procedure(s) (LRB):  VITRECTOMY, PARS PLANA APPROACH  /////left eye (Left)  INJECTION,PHARMACOLOGIC AGENT,INTRAVITREAL (Left)  LENSECTOMY, PARS PLANA APPROACH   ///left (Left)    Final Anesthesia Type: general      Patient location during evaluation: floor  Patient participation: Yes- Able to Participate  Level of consciousness: awake and alert  Post-procedure vital signs: reviewed and stable  Pain management: adequate  Airway patency: patent    PONV status at discharge: No PONV  Anesthetic complications: no      Cardiovascular status: blood pressure returned to baseline  Respiratory status: spontaneous ventilation and room air  Hydration status: euvolemic  Follow-up not needed.              Vitals Value Taken Time   /97 11/18/24 1510   Temp  11/19/24 0714   Pulse 60 11/18/24 1510   Resp 16 11/18/24 1440   SpO2 100 % 11/18/24 1510         Event Time   Out of Recovery 14:05:00         Pain/Ping Score: Pain Rating Prior to Med Admin: 9 (11/18/2024  2:40 PM)  Ping Score: 9 (11/18/2024  2:05 PM)

## 2024-12-16 NOTE — OP NOTE
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    POSTOPERATIVE DIAGNOSIS:  Dense cataract with hypopyon and hyphema and complete   retinal detachment of the left eye.    PROCEDURE:  Pars plana vitrectomy with pars plana lensectomy and complex retinal   detachment repair of the left eye with silicone oil.    ANESTHESIA:  General.    ESTIMATED BLOOD LOSS:  Less than 5 mL.    COMPLICATIONS:  None.    PROCEDURE INDICATIONS:  Mr. Woody has a previous history of retinal surgery of   the left eye done in [place] of unclear the etiology.  No operative report was   available.  The patient does have evidence of light perception vision and a   dense white cataract, as well as a hypopyon.  He has a presumed endophthalmitis   and requires a vitrectomy to treat the endophthalmitis as well as examination   under anesthesia.    PROCEDURE DESCRIPTION:  Patient was taken to the operative theater where general   anesthesia was begun.  The left eye was prepped and draped in the normal   sterile fashion and lid speculum was applied.  A standard 3 port 25-gauge pars   plana vitrectomy was performed with all trocars placed 3.5 mm from the surgical   limbus.  Upon beginning of the surgery, a pars plana lensectomy was performed.    The hypopyon was then aspirated from the anterior chamber with vitrectomy   cutter.  This allowed a view of the retina.  There was a vitreous hemorrhage in   the  posterior cavity, which was aspirated with vitrectomy cutter.  The patient   was found to have a mac-off inferior retinal detachment with evidence of   proliferative vitreoretinopathy.  The patient required an inferior retinectomy.    Endo cautery was used to treat the inferior peripheral retina posterior to the   proliferative vitreoretinopathy and scar tissue formation.  An inferior   retinectomy was performed.  Perfluorocarbon liquid was injected into the eye   flattening the entire retina.  Endolaser was used to treat the inferior   retinectomy as well as the remaining peripheral retina without complication.  An   inferior peripheral iridotomy was created with vitrectomy cutter.  A fluid air   exchange was performed removing the perfluorocarbon liquid and the retina   remained attached with no slippage.  5000 weight silicone oil was then infused   into the vitreous cavity without complication.  All instruments removed from the   eye and all sclerotomies were closed with 7-0 Vicryl suture.  The conjunctiva   was closed with 6-0 fast absorbing gut.  Several drops of TobraDex ophthalmic   solution were applied to the eye.  The postoperative intraocular pressure was 15   mmHg.  The lid speculum and eye drape were then removed and the eye was covered   with a gauze patch and a [place] shield.  The patient was then transported to the   postoperative care unit to recover.    DISCHARGE CONDITION:  Good.    DISPOSITION:  Home with followup with Dr. Hein the following day.  This patient   tolerated procedure well.        ______________________________  MD SIGRID Avery/AQS  DD:  12/16/2024  Time:  01:16PM  DT:  12/16/2024  Time:  01:59PM  Job #:  [id number]      OPERATIVE REPORT

## (undated) DEVICE — SUPPORT ULNA NERVE PROTECTOR

## (undated) DEVICE — FORCEP MAXGRIP REV 25GA PLUS

## (undated) DEVICE — HEMOSTATIC ERASER 25GA

## (undated) DEVICE — OIL SILICONE

## (undated) DEVICE — BENZOIN TINCTURE 0.66ML

## (undated) DEVICE — GLOVE SENSICARE PI MICRO 8

## (undated) DEVICE — NDL REG BEVEL 27GX1/2IN

## (undated) DEVICE — PACK VFI SILICONE OIL SYRINGE

## (undated) DEVICE — SUT 7/0 18IN COATED VICRYL

## (undated) DEVICE — KNIFE MVR STRAIGHT TIP 20GA

## (undated) DEVICE — CANNULA SOFT TIP 25G

## (undated) DEVICE — SUT 6/0 18IN PLAIN GUT D/A

## (undated) DEVICE — CATH IV CATHLON W/HUB18GAX

## (undated) DEVICE — SET BIOM OPTIC HD LENS 175MM

## (undated) DEVICE — KIT PERFLUOROCARBON LIQUID

## (undated) DEVICE — SUT ETHILON 10-0 CS175-8 12IN

## (undated) DEVICE — PACK EVA VITRECTOMY INPUT 25G

## (undated) DEVICE — DRAPE MICROSCOPE HEAD COVER

## (undated) DEVICE — PROBE LASER STEPPED CURVE 25G

## (undated) DEVICE — SPONGE COTTON TRAY 4X4IN

## (undated) DEVICE — Device